# Patient Record
Sex: FEMALE | Race: BLACK OR AFRICAN AMERICAN | NOT HISPANIC OR LATINO | Employment: UNEMPLOYED | URBAN - METROPOLITAN AREA
[De-identification: names, ages, dates, MRNs, and addresses within clinical notes are randomized per-mention and may not be internally consistent; named-entity substitution may affect disease eponyms.]

---

## 2017-07-20 ENCOUNTER — OFFICE VISIT (OUTPATIENT)
Dept: URGENT CARE | Facility: URGENT CARE | Age: 52
End: 2017-07-20
Payer: COMMERCIAL

## 2017-07-20 VITALS
HEART RATE: 85 BPM | TEMPERATURE: 98.7 F | BODY MASS INDEX: 37.48 KG/M2 | DIASTOLIC BLOOD PRESSURE: 83 MMHG | SYSTOLIC BLOOD PRESSURE: 128 MMHG | WEIGHT: 198.38 LBS

## 2017-07-20 DIAGNOSIS — N92.1 METRORRHAGIA: Primary | ICD-10-CM

## 2017-07-20 LAB
ALBUMIN UR-MCNC: 100 MG/DL
APPEARANCE UR: CLEAR
BETA HCG QUAL IFA URINE: NEGATIVE
BILIRUB UR QL STRIP: NEGATIVE
COLOR UR AUTO: YELLOW
GLUCOSE UR STRIP-MCNC: NEGATIVE MG/DL
HGB UR QL STRIP: ABNORMAL
KETONES UR STRIP-MCNC: NEGATIVE MG/DL
LEUKOCYTE ESTERASE UR QL STRIP: NEGATIVE
NITRATE UR QL: NEGATIVE
NON-SQ EPI CELLS #/AREA URNS LPF: ABNORMAL /LPF
PH UR STRIP: 5.5 PH (ref 5–7)
RBC #/AREA URNS AUTO: ABNORMAL /HPF (ref 0–2)
SP GR UR STRIP: 1.02 (ref 1–1.03)
URN SPEC COLLECT METH UR: ABNORMAL
UROBILINOGEN UR STRIP-ACNC: 0.2 EU/DL (ref 0.2–1)
WBC #/AREA URNS AUTO: ABNORMAL /HPF (ref 0–2)

## 2017-07-20 PROCEDURE — 84703 CHORIONIC GONADOTROPIN ASSAY: CPT | Performed by: PHYSICIAN ASSISTANT

## 2017-07-20 PROCEDURE — 99214 OFFICE O/P EST MOD 30 MIN: CPT | Performed by: PHYSICIAN ASSISTANT

## 2017-07-20 PROCEDURE — 81001 URINALYSIS AUTO W/SCOPE: CPT | Performed by: PHYSICIAN ASSISTANT

## 2017-07-20 NOTE — NURSING NOTE
"Chief Complaint   Patient presents with     Vaginal Bleeding     vaginal spotting for 4 days - states she has not had a menses cycle for 8 months.       Initial /83  Pulse 85  Temp 98.7  F (37.1  C) (Oral)  Wt 198 lb 6 oz (90 kg)  Breastfeeding? No  BMI 37.48 kg/m2 Estimated body mass index is 37.48 kg/(m^2) as calculated from the following:    Height as of 5/5/16: 5' 1\" (1.549 m).    Weight as of this encounter: 198 lb 6 oz (90 kg).  Medication Reconciliation: complete    "

## 2017-07-20 NOTE — MR AVS SNAPSHOT
"              After Visit Summary   7/20/2017    Lindsey Fuentes    MRN: [id number]           Patient Information     Date Of Birth          1965        Visit Information        Provider Department      7/20/2017 4:20 PM Gama Sullivan PA-C [facility]        Today's Diagnoses     Metrorrhagia    -  1       Follow-ups after your visit        Your next 10 appointments already scheduled     Jul 24, 2017  1:30 PM CDT   Office Visit with Fadi Thornton MD   [facility] ([facility])    [address] [place] [address] [phone]           Bring a current list of meds and any records pertaining to this visit.  For Physicals, please bring immunization records and any forms needing to be filled out.  Please arrive 10 minutes early to complete paperwork.              Who to contact     If you have questions or need follow up information about today's clinic visit or your schedule please contact [facility] directly at [phone].  Normal or non-critical lab and imaging results will be communicated to you by MyChart, letter or phone within 4 business days after the clinic has received the results. If you do not hear from us within 7 days, please contact the clinic through [employer] or phone. If you have a critical or abnormal lab result, we will notify you by phone as soon as possible.  Submit refill requests through [employer] or call your pharmacy and they will forward the refill request to us. Please allow 3 business days for your refill to be completed.          Additional Information About Your Visit        MyChart Information     [employer] lets you send messages to your doctor, view your test results, renew your prescriptions, schedule appointments and more. To sign up, go to [email or web address] . Click on \"Log in\" on the left side of the screen, which will take you to the Welcome page. Then click on " "\"Sign up Now\" on the right side of the page.     You will be asked to enter the access code listed below, as well as some personal information. Please follow the directions to create your username and password.     Your access code is: [email or web address]  Expires: 10/19/2017  9:19 AM     Your access code will  in 90 days. If you need help or a new code, please call your [place] clinic or [phone].        Care EveryWhere ID     This is your Care EveryWhere ID. This could be used by other organizations to access your [place] medical records  [id number]        Your Vitals Were     Pulse Temperature Breastfeeding? BMI (Body Mass Index)          85 98.7  F (37.1  C) (Oral) No 37.48 kg/m2         Blood Pressure from Last 3 Encounters:   17 128/83   16 122/82   10/20/14 146/87    Weight from Last 3 Encounters:   17 198 lb 6 oz (90 kg)   16 195 lb (88.5 kg)   10/20/14 191 lb 3.2 oz (86.7 kg)              We Performed the Following     Beta HCG qual IFA urine     UA with Microscopic reflex to Culture        Primary Care Provider Office Phone # Fax #    Maria Guadalupe PIERRE MD Zach [phone]       [facility]    [address] [facility] [address]  [facility] [address]        Equal Access to Services     [facility]: Hadii aad ku hadasho Somarianaali, waaxda luqadaha, qaybta kaalmada aderichardyada, edith limon . So [facility] [phone].    ATENCIÓN: Si habla español, tiene a alcaraz disposición servicios gratuitos de asistencia lingüística. Llame al [phone].    We comply with applicable federal civil rights laws and [place] laws. We do not discriminate on the basis of race, color, national origin, age, disability sex, sexual orientation or gender identity.            Thank you!     Thank you for choosing [facility]  for your care. Our goal is always to provide you with excellent care. Hearing back from our patients is one way we " can continue to improve our services. Please take a few minutes to complete the written survey that you may receive in the mail after your visit with us. Thank you!             Your Updated Medication List - Protect others around you: Learn how to safely use, store and throw away your medicines at www.disposemymeds.org.          This list is accurate as of: 7/20/17 11:59 PM.  Always use your most recent med list.                   Brand Name Dispense Instructions for use Diagnosis    Calcium Carbonate-Simethicone 500-125 MG Chew     120 tablet    Take 1 tablet by mouth 3 times daily as needed    Flatulence, eructation, and gas pain       TYLENOL PO      Take 500 mg by mouth every 4 hours as needed for mild pain or fever

## 2017-07-21 NOTE — PROGRESS NOTES
SUBJECTIVE:  Lindsey Fuentes is a 51 year old female who presents with vaginal bleeding.    Onset of symptoms 4 day(s) ago, unchanged since.     Pain:none.     Vaginal bleeding: spotting      Vaginal symptoms: spotting  No LMP recorded. Patient is not currently having periods (Reason: Irregular Periods).    Sexually active: no  Predisposing factors: has not had a period in over a nestor  Hx of previous symptom: some    Past Medical History:   Diagnosis Date     Meningioma (H)      Current Outpatient Prescriptions   Medication Sig Dispense Refill     Calcium Carbonate-Simethicone 500-125 MG CHEW Take 1 tablet by mouth 3 times daily as needed (Patient not taking: Reported on 7/20/2017) 120 tablet 0     Acetaminophen (TYLENOL PO) Take 500 mg by mouth every 4 hours as needed for mild pain or fever       Social History     Social History     Marital status:      Spouse name: N/A     Number of children: N/A     Years of education: N/A     Occupational History     Not on file.     Social History Main Topics     Smoking status: Never Smoker     Smokeless tobacco: Never Used     Alcohol use No     Drug use: No     Sexual activity: Yes     Partners: Male     Other Topics Concern     Not on file     Social History Narrative       ROS:   CONSTITUTIONAL:NEGATIVE for fever, chills, change in weight  INTEGUMENTARY/SKIN: NEGATIVE for worrisome rashes, moles or lesions  ENT/MOUTH: NEGATIVE for ear, mouth and throat problems  RESP:NEGATIVE for significant cough or SOB  CV: NEGATIVE for chest pain, palpitations or peripheral edema  GI: NEGATIVE for nausea, abdominal pain, heartburn, or change in bowel habits  : POSITIVE for vaginal bleeding  MUSCULOSKELETAL: NEGATIVE for significant arthralgias or myalgia  NEURO: NEGATIVE for weakness, dizziness or paresthesias    OBJECTIVE:  /83  Pulse 85  Temp 98.7  F (37.1  C) (Oral)  Wt 198 lb 6 oz (90 kg)  Breastfeeding? No  BMI 37.48 kg/m2    GENERAL APPEARANCE: healthy, alert  and no distress  CV: regular rates and rhythm, normal S1 S2, no murmur noted  ABDOMEN:  soft, nontender, no HSM or masses and bowel sounds normal  BACK: No CVA tenderness  : Deferred  SKIN: no suspicious lesions or rashes    Results for orders placed or performed in visit on 07/20/17   UA with Microscopic reflex to Culture   Result Value Ref Range    Color Urine Yellow     Appearance Urine Clear     Glucose Urine Negative NEG mg/dL    Bilirubin Urine Negative NEG    Ketones Urine Negative NEG mg/dL    Specific Gravity Urine 1.025 1.003 - 1.035    pH Urine 5.5 5.0 - 7.0 pH    Protein Albumin Urine 100 (A) NEG mg/dL    Urobilinogen Urine 0.2 0.2 - 1.0 EU/dL    Nitrite Urine Negative NEG    Blood Urine Large (A) NEG    Leukocyte Esterase Urine Negative NEG    Source Midstream Urine     WBC Urine O - 2 0 - 2 /HPF    RBC Urine 2-5 (A) 0 - 2 /HPF    Squamous Epithelial /LPF Urine Few FEW /LPF   Beta HCG qual IFA urine   Result Value Ref Range    Beta HCG Qual IFA Urine Negative NEG       ASSESSMENT/PLAN:    ICD-10-CM    1. Metrorrhagia N92.1 UA with Microscopic reflex to Culture     Beta HCG qual IFA urine     Patient scheduled with GYN

## 2017-09-08 ENCOUNTER — ALLIED HEALTH/NURSE VISIT (OUTPATIENT)
Dept: LAB | Facility: CLINIC | Age: 52
End: 2017-09-08
Payer: COMMERCIAL

## 2017-09-08 DIAGNOSIS — Z11.1 SCREENING EXAMINATION FOR PULMONARY TUBERCULOSIS: Primary | ICD-10-CM

## 2017-09-08 PROCEDURE — 99207 ZZC DROP WITH A PROCEDURE: CPT | Mod: 25

## 2017-09-08 PROCEDURE — 36415 COLL VENOUS BLD VENIPUNCTURE: CPT | Performed by: NURSE PRACTITIONER

## 2017-09-08 PROCEDURE — 86480 TB TEST CELL IMMUN MEASURE: CPT | Performed by: NURSE PRACTITIONER

## 2017-09-08 NOTE — LETTER
[facility]  [address]  [place]  [address]  Tel: [phone]    September 15, 2017    Lindsey Garciairish  [address] [facility] [address]        Dear Ms. Fuentes,        This is to inform you regarding your test result.    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  Will discuss further on your next office visit with me   Keep your appointment .      Sincerely,    Maria Guadalupe Serrano MD/CATHIE      Enclosure: Lab Results  Results for orders placed or performed in visit on 09/08/17   M Tuberculosis by Quantiferon   Result Value Ref Range    M Tuberculosis Result Positive (A) NEG^Negative    M Tuberculosis Antigen Value 8.73 IU/mL

## 2017-09-08 NOTE — MR AVS SNAPSHOT
"              After Visit Summary   9/8/2017    Lindsey Fuentes    MRN: [id number]           Patient Information     Date Of Birth          1965        Visit Information        Provider Department      9/8/2017 11:45 AM CS LAB [facility]        Today's Diagnoses     Screening examination for pulmonary tuberculosis    -  1       Follow-ups after your visit        Your next 10 appointments already scheduled     Sep 18, 2017  4:00 PM CDT   PHYSICAL with Maria Guadalupe Serrano MD   [facility]    [address] [facility] [address] [phone]              Who to contact     If you have questions or need follow up information about today's clinic visit or your schedule please contact [facility] directly at [phone].  Normal or non-critical lab and imaging results will be communicated to you by MyChart, letter or phone within 4 business days after the clinic has received the results. If you do not hear from us within 7 days, please contact the clinic through MyChart or phone. If you have a critical or abnormal lab result, we will notify you by phone as soon as possible.  Submit refill requests through [employer] or call your pharmacy and they will forward the refill request to us. Please allow 3 business days for your refill to be completed.          Additional Information About Your Visit        MyChart Information     [employer] lets you send messages to your doctor, view your test results, renew your prescriptions, schedule appointments and more. To sign up, go to [email or web address] . Click on \"Log in\" on the left side of the screen, which will take you to the Welcome page. Then click on \"Sign up Now\" on the right side of the page.     You will be asked to enter the access code listed below, as well as some personal information. Please follow the directions to create your username and password.     Your access code is: [email or web address]  Expires: " 10/19/2017  9:19 AM     Your access code will  in 90 days. If you need help or a new code, please call your [place] clinic or [phone].        Care EveryWhere ID     This is your Care EveryWhere ID. This could be used by other organizations to access your [place] medical records  [id number]         Blood Pressure from Last 3 Encounters:   17 128/83   16 122/82   10/20/14 146/87    Weight from Last 3 Encounters:   17 198 lb 6 oz (90 kg)   16 195 lb (88.5 kg)   10/20/14 191 lb 3.2 oz (86.7 kg)              We Performed the Following     M Tuberculosis by Quantiferon        Primary Care Provider Office Phone # Fax #    Maria Guadalupe GABBY Serrano [phone] [address]  [place]                [place] [address]        Equal Access to Services     [facility] : Hadii aad ku hadasho Jos, waaxda luqadaha, qaybta kaalmada adeegyada, waxay laurain hayeugenen halle limon . So [facility] [phone].    ATENCIÓN: Si habla español, tiene a alcaraz disposición servicios gratuitos de asistencia lingüística. Llame al [phone].    We comply with applicable federal civil rights laws and [place] laws. We do not discriminate on the basis of race, color, national origin, age, disability sex, sexual orientation or gender identity.            Thank you!     Thank you for choosing [facility]  for your care. Our goal is always to provide you with excellent care. Hearing back from our patients is one way we can continue to improve our services. Please take a few minutes to complete the written survey that you may receive in the mail after your visit with us. Thank you!             Your Updated Medication List - Protect others around you: Learn how to safely use, store and throw away your medicines at www.disposemymeds.org.          This list is accurate as of: 17 11:59 PM.  Always use your most recent med list.                   Brand Name Dispense Instructions for  use Diagnosis    Calcium Carbonate-Simethicone 500-125 MG Chew     120 tablet    Take 1 tablet by mouth 3 times daily as needed    Flatulence, eructation, and gas pain       TYLENOL PO      Take 500 mg by mouth every 4 hours as needed for mild pain or fever

## 2017-09-11 LAB
M TB TUBERC IFN-G BLD QL: POSITIVE
M TB TUBERC IFN-G/MITOGEN IGNF BLD: 8.73 IU/ML

## 2017-09-14 NOTE — PROGRESS NOTES
Please notify patient by sending following letter with copy of test results    Yony Portillo,    This is to inform you regarding your test result.    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  Will discuss further on your next office visit with me   Keep your appointment .      Dr.Nasima Zach MD,FACP

## 2017-09-18 ENCOUNTER — TELEPHONE (OUTPATIENT)
Dept: FAMILY MEDICINE | Facility: CLINIC | Age: 52
End: 2017-09-18

## 2017-09-18 DIAGNOSIS — R76.12 POSITIVE QUANTIFERON-TB GOLD TEST: Primary | ICD-10-CM

## 2017-09-18 NOTE — TELEPHONE ENCOUNTER
Spoke with patient cannot get in to see Zach today but Dr Serrano agreed to put in order and patient will be in before 1pm to get xray. Please advise once order is signed so we can put patient on schedule for today.    Jeannine Luis, A

## 2017-09-18 NOTE — TELEPHONE ENCOUNTER
Reason for Call:  Other xray    Detailed comments: patient had a positive TB test and wants to come in for an xray today if possible.     Phone Number Patient can be reached at: Home number on file [phone] (home)    Best Time: any    Can we leave a detailed message on this number? YES    Call taken on 9/18/2017 at 9:55 AM by Marysol Bailey

## 2017-09-18 NOTE — TELEPHONE ENCOUNTER
She can make appointment to do that   I have opening today  Will discuss the treatment options also.  Dr.Nasima Zach MD

## 2017-12-20 ENCOUNTER — RADIANT APPOINTMENT (OUTPATIENT)
Dept: GENERAL RADIOLOGY | Facility: CLINIC | Age: 52
End: 2017-12-20
Attending: INTERNAL MEDICINE
Payer: COMMERCIAL

## 2017-12-20 DIAGNOSIS — R76.12 POSITIVE QUANTIFERON-TB GOLD TEST: ICD-10-CM

## 2017-12-20 PROCEDURE — 71020 XR CHEST 2 VW: CPT | Performed by: RADIOLOGY

## 2017-12-20 NOTE — LETTER
[facility]  [address] [place] [address] [facility] [facility]  [address]  [place]  [address]  Tel: [phone]    December 22, 2017    Lindsey Fuentes  [address] [facility] [address]        Dear Ms. Fuentes,    This is to inform you regarding your test result.    Chest XR result is satisfactory .    If you have any further questions or problems, please contact our office.      Sincerely,    Maria Guadalupe Serrano MD/mook    Results for orders placed or performed in visit on 12/20/17   XR Chest 2 Views    Narrative    XR CHEST 2 VW 12/20/2017 11:48 AM    CLINICAL HISTORY: ; Positive QuantiFERON-TB Gold test    COMPARISON: None    FINDINGS:   Question calcified nodules in the right upper lobe. The  lungs and pleural spaces are otherwise clear. The cardiac silhouette  and pulmonary vascularity are normal.      Impression    IMPRESSION: Possible calcified nodules in the right upper lung. No  acute intrathoracic disease.    SALMA AYERS MD           Enclosure: Lab Results

## 2017-12-22 NOTE — PROGRESS NOTES
Please notify patient by sending following letter with copy of test results      Yony Portillo,    This is to inform you regarding your test result.    Chest XR result is satisfactory .    Sincerely,      Dr.Nasima Zach MD,FACP

## 2018-01-02 ENCOUNTER — HOSPITAL ENCOUNTER (OUTPATIENT)
Dept: MAMMOGRAPHY | Facility: CLINIC | Age: 53
Discharge: HOME | End: 2018-01-02
Attending: INTERNAL MEDICINE | Admitting: INTERNAL MEDICINE
Payer: COMMERCIAL

## 2018-01-02 ENCOUNTER — OFFICE VISIT (OUTPATIENT)
Dept: FAMILY MEDICINE | Facility: CLINIC | Age: 53
End: 2018-01-02
Payer: COMMERCIAL

## 2018-01-02 VITALS
TEMPERATURE: 97.9 F | WEIGHT: 198 LBS | DIASTOLIC BLOOD PRESSURE: 86 MMHG | HEIGHT: 62 IN | RESPIRATION RATE: 16 BRPM | BODY MASS INDEX: 36.44 KG/M2 | SYSTOLIC BLOOD PRESSURE: 136 MMHG | HEART RATE: 90 BPM

## 2018-01-02 DIAGNOSIS — Z12.31 VISIT FOR SCREENING MAMMOGRAM: ICD-10-CM

## 2018-01-02 DIAGNOSIS — Z00.00 ROUTINE HISTORY AND PHYSICAL EXAMINATION OF ADULT: Primary | ICD-10-CM

## 2018-01-02 DIAGNOSIS — E66.812 CLASS 2 OBESITY DUE TO EXCESS CALORIES WITHOUT SERIOUS COMORBIDITY WITH BODY MASS INDEX (BMI) OF 36.0 TO 36.9 IN ADULT: ICD-10-CM

## 2018-01-02 DIAGNOSIS — Z12.11 SCREEN FOR COLON CANCER: ICD-10-CM

## 2018-01-02 DIAGNOSIS — H61.22 LEFT EAR IMPACTED CERUMEN: ICD-10-CM

## 2018-01-02 DIAGNOSIS — Z13.228 SCREENING FOR METABOLIC DISORDER: ICD-10-CM

## 2018-01-02 DIAGNOSIS — E66.09 CLASS 2 OBESITY DUE TO EXCESS CALORIES WITHOUT SERIOUS COMORBIDITY WITH BODY MASS INDEX (BMI) OF 36.0 TO 36.9 IN ADULT: ICD-10-CM

## 2018-01-02 DIAGNOSIS — Z13.0 SCREENING FOR DEFICIENCY ANEMIA: ICD-10-CM

## 2018-01-02 DIAGNOSIS — R31.29 MICROSCOPIC HEMATURIA: ICD-10-CM

## 2018-01-02 LAB
ALBUMIN UR-MCNC: >=300 MG/DL
APPEARANCE UR: CLEAR
BILIRUB UR QL STRIP: NEGATIVE
COLOR UR AUTO: YELLOW
ERYTHROCYTE [DISTWIDTH] IN BLOOD BY AUTOMATED COUNT: 15.4 % (ref 10–15)
GLUCOSE UR STRIP-MCNC: NEGATIVE MG/DL
HCT VFR BLD AUTO: 39.4 % (ref 35–47)
HGB BLD-MCNC: 12.7 G/DL (ref 11.7–15.7)
HGB UR QL STRIP: ABNORMAL
KETONES UR STRIP-MCNC: NEGATIVE MG/DL
LEUKOCYTE ESTERASE UR QL STRIP: NEGATIVE
MCH RBC QN AUTO: 26.6 PG (ref 26.5–33)
MCHC RBC AUTO-ENTMCNC: 32.2 G/DL (ref 31.5–36.5)
MCV RBC AUTO: 82 FL (ref 78–100)
MUCOUS THREADS #/AREA URNS LPF: PRESENT /LPF
NITRATE UR QL: NEGATIVE
NON-SQ EPI CELLS #/AREA URNS LPF: ABNORMAL /LPF
PH UR STRIP: 8.5 PH (ref 5–7)
PLATELET # BLD AUTO: 237 10E9/L (ref 150–450)
RBC # BLD AUTO: 4.78 10E12/L (ref 3.8–5.2)
RBC #/AREA URNS AUTO: ABNORMAL /HPF
SOURCE: ABNORMAL
SP GR UR STRIP: 1.02 (ref 1–1.03)
UROBILINOGEN UR STRIP-ACNC: 0.2 EU/DL (ref 0.2–1)
WBC # BLD AUTO: 4.8 10E9/L (ref 4–11)
WBC #/AREA URNS AUTO: ABNORMAL /HPF

## 2018-01-02 PROCEDURE — 36415 COLL VENOUS BLD VENIPUNCTURE: CPT | Performed by: INTERNAL MEDICINE

## 2018-01-02 PROCEDURE — 85027 COMPLETE CBC AUTOMATED: CPT | Performed by: INTERNAL MEDICINE

## 2018-01-02 PROCEDURE — 99396 PREV VISIT EST AGE 40-64: CPT | Performed by: INTERNAL MEDICINE

## 2018-01-02 PROCEDURE — 81001 URINALYSIS AUTO W/SCOPE: CPT | Performed by: INTERNAL MEDICINE

## 2018-01-02 PROCEDURE — 82728 ASSAY OF FERRITIN: CPT | Performed by: INTERNAL MEDICINE

## 2018-01-02 PROCEDURE — 80053 COMPREHEN METABOLIC PANEL: CPT | Performed by: INTERNAL MEDICINE

## 2018-01-02 PROCEDURE — 84443 ASSAY THYROID STIM HORMONE: CPT | Performed by: INTERNAL MEDICINE

## 2018-01-02 PROCEDURE — 80061 LIPID PANEL: CPT | Performed by: INTERNAL MEDICINE

## 2018-01-02 PROCEDURE — 77067 SCR MAMMO BI INCL CAD: CPT

## 2018-01-02 NOTE — PROGRESS NOTES
SUBJECTIVE:   CC: Lindsey Fuentes is an 52 year old woman who presents for preventive health visit.     Healthy Habits:    Do you get at least three servings of calcium containing foods daily (dairy, green leafy vegetables, etc.)yes    Amount of exercise or daily activities, outside of work: 2 day(s) per week    Problems taking medications regularly not applicable    Medication side effects: No    Have you had an eye exam in the past two years? no    Do you see a dentist twice per year? yes    Do you have sleep apnea, excessive snoring or daytime drowsiness?no    Reports rectal mass upon touch  Reports mild intermittent left leg pain    Today's PHQ-2 Score:   PHQ-2 ( 1999 Pf[employer]) 1/2/2018 5/5/2016   Q1: Little interest or pleasure in doing things 0 0   Q2: Feeling down, depressed or hopeless 0 0   PHQ-2 Score 0 0       Abuse: Current or Past(Physical, Sexual or Emotional)- No  Do you feel safe in your environment - Yes    Social History   Substance Use Topics     Smoking status: Never Smoker     Smokeless tobacco: Never Used     Alcohol use No     If you drink alcohol do you typically have >3 drinks per day or >7 drinks per week? No                     Reviewed orders with patient.  Reviewed health maintenance and updated orders accordingly - Yes  Labs reviewed in EPIC  Patient Active Problem List   Diagnosis     CARDIOVASCULAR SCREENING; LDL GOAL LESS THAN 160     Meningioma (H)     Elevated blood pressure     GERD (gastroesophageal reflux disease)     Microscopic hematuria     Positive QuantiFERON-TB Gold test     Class 2 obesity due to excess calories without serious comorbidity with body mass index (BMI) of 36.0 to 36.9 in adult     Past Surgical History:   Procedure Laterality Date     HEAD & NECK SURGERY      removal of meningioma       Social History   Substance Use Topics     Smoking status: Never Smoker     Smokeless tobacco: Never Used     Alcohol use No     Family History   Problem Relation Age of  Onset     CEREBROVASCULAR DISEASE Mother      Hypertension Mother      CEREBROVASCULAR DISEASE Father      Breast Cancer No family hx of      Cancer - colorectal No family hx of          Current Outpatient Prescriptions   Medication Sig Dispense Refill     Calcium Carbonate-Simethicone 500-125 MG CHEW Take 1 tablet by mouth 3 times daily as needed (Patient not taking: Reported on 7/20/2017) 120 tablet 0     Acetaminophen (TYLENOL PO) Take 500 mg by mouth every 4 hours as needed for mild pain or fever       Allergies   Allergen Reactions     No Known Allergies          Patient over age 50, mutual decision to screen reflected in health maintenance.      Pertinent mammograms are reviewed under the imaging tab.  History of abnormal Pap smear: NO - age 30- 65 PAP every 3 years recommended    Reviewed and updated as needed this visit by clinical staffTobacco  Allergies  Meds         Reviewed and updated as needed this visit by Provider          ROS:  C: NEGATIVE for fever, chills, change in weight  I: NEGATIVE for worrisome rashes, moles or lesions  E: NEGATIVE for vision changes or irritation  ENT: NEGATIVE for ear, mouth and throat problems  R: NEGATIVE for significant cough or SOB  B: NEGATIVE for masses, tenderness or discharge  CV: NEGATIVE for chest pain, palpitations or peripheral edema  GI: NEGATIVE for nausea, abdominal pain, heartburn, or change in bowel habits, POSITIVE for rectal mass  : NEGATIVE for unusual urinary or vaginal symptoms. No vaginal bleeding.  M: POSITIVE for left leg pain  N: NEGATIVE for weakness, dizziness or paresthesias  P: NEGATIVE for changes in mood or affect     This document serves as a record of the services and decisions personally performed and made by Maria Guadalupe Serrano MD. It was created on her behalf by Leigha Bello, a trained medical scribe. The creation of this document is based on the provider's statements to the medical scribe.  Leigha Bello 1:10 PM January 2,  "2018    OBJECTIVE:   /86  Pulse 90  Temp 97.9  F (36.6  C) (Oral)  Resp 16  Ht 1.562 m (5' 1.5\")  Wt 89.8 kg (198 lb)  LMP 11/05/2017  Breastfeeding? No  BMI 36.81 kg/m2  EXAM:  GENERAL APPEARANCE: healthy, alert and no distress  EYES: Eyes grossly normal to inspection, PERRL and conjunctivae and sclerae normal  HENT: bilateral ceruminosis impacted in left ear, inflamed nasal mucosa, mouth without ulcers or lesions, oropharynx clear and oral mucous membranes moist  NECK: no adenopathy, no asymmetry, masses, or scars and thyroid normal to palpation  RESP: lungs clear to auscultation - no rales, rhonchi or wheezes  BREAST: normal without masses, tenderness or nipple discharge and no palpable axillary masses or adenopathy  CV: regular rate and rhythm, normal S1 S2, no S3 or S4, no murmur, click or rub, mild bilateral LL edema and peripheral pulses strong  ABDOMEN: soft, nontender, no hepatosplenomegaly, no masses and bowel sounds normal  RECTAL: normal sphincter tone, one small external hemorrhoid  MS: no musculoskeletal defects are noted and gait is age appropriate without ataxia  SKIN: no suspicious lesions or rashes  NEURO: Normal strength and tone, sensory exam grossly normal, mentation intact and speech normal  PSYCH: mentation appears normal and affect normal/bright    ASSESSMENT/PLAN:   Lindsey was seen today for physical.    Diagnoses and all orders for this visit:    Routine history and physical examination of adult  -     TSH with free T4 reflex  -     Lipid panel reflex to direct LDL Fasting  -     CBC with platelets  -     UA reflex to Microscopic  Patient came in today for a wellness visit  She reported mild intermittent left leg pain and a rectal mass  Upon examination, she had bilateral mild LL edema and one small external hemorrhoid  I have ordered a colonoscopy for her as she's due  She had been complaining of rhinorrhea and sinus symptoms  Upon examination, I found her nasal mucosa " swollen  I advised her to use saline nasal spray 2-3 times daily    Visit for screening mammogram  -     *MA Screening Digital Bilateral; Future  Patient is due for her mammogram  I advised her to get it done at her earliest convenience    Screen for colon cancer  -     GASTROENTEROLOGY ADULT REF PROCEDURE ONLY  Patient is due for colonoscopy  I referred her to GI to get it done    Microscopic hematuria  -     UA reflex to Microscopic  Patient had microscopic hematuria on tests done in 07/20/17  I re-did her tests today to see if this is still an issue  She doesn't report any urinary symptoms    Screening for metabolic disorder  -     Comprehensive metabolic panel    Class 2 obesity due to excess calories without serious comorbidity with body mass index (BMI) of 36.0 to 36.9 in adult  I emphasized importance of weight-loss due to her high BMI  I educated her about healthy eating and exercise habits  Patient verbally agreed to make more cognizant decisions to help her lose weight    Left ear impacted cerumen  Upon examination, there was cerumen in both ears but it was impacted in left ear  Ear wash today    Patient Instructions   Ear wash today  Labs today  Schedule mammogram at your earliest convenience  Schedule colonoscopy at your earliest convenience  [place] [facility] [phone]  Start using ocean nasal( saline nasal spray)  Spray regularly    COUNSELING:   Reviewed preventive health counseling, as reflected in patient instructions       Regular exercise       Healthy diet/nutrition       Colon cancer screening    BP Screening:   Last 3 BP Readings:    BP Readings from Last 3 Encounters:   01/02/18 136/86   07/20/17 128/83   05/05/16 122/82       The following was recommended to the patient:  Re-screen BP within a year and recommended lifestyle modifications     reports that she has never smoked. She has never used smokeless tobacco.    Estimated body mass index is 36.81 kg/(m^2) as calculated from the  "following:    Height as of this encounter: 1.562 m (5' 1.5\").    Weight as of this encounter: 89.8 kg (198 lb).   Weight management plan: Discussed healthy diet and exercise guidelines and patient will follow up in 12 months in clinic to re-evaluate.    Counseling Resources:  ATP IV Guidelines  Pooled Cohorts Equation Calculator  Breast Cancer Risk Calculator  FRAX Risk Assessment  ICSI Preventive Guidelines  Dietary Guidelines for Americans, 2010  USDA's MyPlate  ASA Prophylaxis  Lung CA Screening    The information in this document, created by the medical scribe for me, accurately reflects the services I personally performed and the decisions made by me. I have reviewed and approved this document for accuracy prior to leaving the patient care area.  January 2, 2018 1:29 PM    Maria Guadalupe Serrano MD  [facility]  "

## 2018-01-02 NOTE — NURSING NOTE
"Chief Complaint   Patient presents with     Physical     fasting       Initial /86  Pulse 90  Temp 97.9  F (36.6  C) (Oral)  Resp 16  Ht 5' 1.5\" (1.562 m)  Wt 198 lb (89.8 kg)  LMP 11/05/2017  Breastfeeding? No  BMI 36.81 kg/m2 Estimated body mass index is 36.81 kg/(m^2) as calculated from the following:    Height as of this encounter: 5' 1.5\" (1.562 m).    Weight as of this encounter: 198 lb (89.8 kg).  Medication Reconciliation: complete  "

## 2018-01-02 NOTE — MR AVS SNAPSHOT
After Visit Summary   1/2/2018    Lindsey Fuentes    MRN: [id number]           Patient Information     Date Of Birth          1965        Visit Information        Provider Department      1/2/2018 1:00 PM Maria Guadalupe Serrano MD [facility]        Today's Diagnoses     Routine history and physical examination of adult    -  1    Visit for screening mammogram        Screen for colon cancer        Microscopic hematuria        Screening for metabolic disorder        Class 2 obesity due to excess calories without serious comorbidity with body mass index (BMI) of 36.0 to 36.9 in adult        Left ear impacted cerumen          Care Instructions      Preventive Health Recommendations  Female Ages 50 - 64    Yearly exam: See your health care provider every year in order to  o Review health changes.   o Discuss preventive care.    o Review your medicines if your doctor has prescribed any.      Get a Pap test every three years (unless you have an abnormal result and your provider advises testing more often).    If you get Pap tests with HPV test, you only need to test every 5 years, unless you have an abnormal result.     You do not need a Pap test if your uterus was removed (hysterectomy) and you have not had cancer.    You should be tested each year for STDs (sexually transmitted diseases) if you're at risk.     Have a mammogram every 1 to 2 years.    Have a colonoscopy at age 50, or have a yearly FIT test (stool test). These exams screen for colon cancer.      Have a cholesterol test every 5 years, or more often if advised.    Have a diabetes test (fasting glucose) every three years. If you are at risk for diabetes, you should have this test more often.     If you are at risk for osteoporosis (brittle bone disease), think about having a bone density scan (DEXA).    Shots: Get a flu shot each year. Get a tetanus shot every 10 years.    Nutrition:     Eat at least 5 servings of fruits and  vegetables each day.    Eat whole-grain bread, whole-wheat pasta and brown rice instead of white grains and rice.    Talk to your provider about Calcium and Vitamin D.     Lifestyle    Exercise at least 150 minutes a week (30 minutes a day, 5 days a week). This will help you control your weight and prevent disease.    Limit alcohol to one drink per day.    No smoking.     Wear sunscreen to prevent skin cancer.     See your dentist every six months for an exam and cleaning.    See your eye doctor every 1 to 2 years.    Ear wash today  Labs today  Schedule mammogram at your earliest convenience  Schedule colonoscopy at your earliest convenience  [facility] [phone]  Start using ocean nasal( saline nasal spray)  Spray regularly    Your BMI is Body mass index is 36.81 kg/(m^2).  Weight management is a personal decision.  If you are interested in exploring weight loss strategies, the following discussion covers the approaches that may be successful. Body mass index (BMI) is one way to tell whether you are at a healthy weight, overweight, or obese. It measures your weight in relation to your height.  A BMI of 18.5 to 24.9 is in the healthy range. A person with a BMI of 25 to 29.9 is considered overweight, and someone with a BMI of 30 or greater is considered obese. More than two-thirds of American adults are considered overweight or obese.  Being overweight or obese increases the risk for further weight gain. Excess weight may lead to heart disease and diabetes.  Creating and following plans for healthy eating and physical activity may help you improve your health.  Weight control is part of healthy lifestyle and includes exercise, emotional health, and healthy eating habits. Careful eating habits lifelong are the mainstay of weight control. Though there are significant health benefits from weight loss, long-term weight loss with diet alone may be very difficult to achieve- studies show long-term success  with dietary management in less than 10% of people. Attaining a healthy weight may be especially difficult to achieve in those with severe obesity. In some cases, medications, devices and surgical management might be considered.  What can you do?  If you are overweight or obese and are interested in methods for weight loss, you should discuss this with your provider.     Consider reducing daily calorie intake by 500 calories.     Keep a food journal.     Avoiding skipping meals, consider cutting portions instead.    Diet combined with exercise helps maintain muscle while optimizing fat loss. Strength training is particularly important for building and maintaining muscle mass. Exercise helps reduce stress, increase energy, and improves fitness. Increasing exercise without diet control, however, may not burn enough calories to loose weight.       Start walking three days a week 10-20 minutes at a time    Work towards walking thirty minutes five days a week     Eventually, increase the speed of your walking for 1-2 minutes at time    In addition, we recommend that you review healthy lifestyles and methods for weight loss available through the National Institutes of Health patient information sites:  http://win.niddk.nih.gov/publications/index.htm    And look into health and wellness programs that may be available through your health insurance provider, employer, local community center, or nely club.                Follow-ups after your visit        Additional Services     GASTROENTEROLOGY ADULT REF PROCEDURE ONLY                 Future tests that were ordered for you today     Open Future Orders        Priority Expected Expires Ordered    *MA Screening Digital Bilateral Routine  1/2/2019 1/2/2018            Who to contact     If you have questions or need follow up information about today's clinic visit or your schedule please contact [facility] directly at [phone].  Normal or non-critical lab and  "imaging results will be communicated to you by MyChart, letter or phone within 4 business days after the clinic has received the results. If you do not hear from us within 7 days, please contact the clinic through [employer] or phone. If you have a critical or abnormal lab result, we will notify you by phone as soon as possible.  Submit refill requests through Mr. Number or call your pharmacy and they will forward the refill request to us. Please allow 3 business days for your refill to be completed.          Additional Information About Your Visit        [facility] Information     Mr. Number lets you send messages to your doctor, view your test results, renew your prescriptions, schedule appointments and more. To sign up, go to [email or web address] . Click on \"Log in\" on the left side of the screen, which will take you to the Welcome page. Then click on \"Sign up Now\" on the right side of the page.     You will be asked to enter the access code listed below, as well as some personal information. Please follow the directions to create your username and password.     Your access code is: [email or web address]  Expires: 2018  9:36 AM     Your access code will  in 90 days. If you need help or a new code, please call your [place] clinic or [phone].        Care EveryWhere ID     This is your Care EveryWhere ID. This could be used by other organizations to access your [place] medical records  [id number]        Your Vitals Were     Pulse Temperature Respirations Height Last Period Breastfeeding?    90 97.9  F (36.6  C) (Oral) 16 5' 1.5\" (1.562 m) 2017 No    BMI (Body Mass Index)                   36.81 kg/m2            Blood Pressure from Last 3 Encounters:   18 136/86   17 128/83   16 122/82    Weight from Last 3 Encounters:   18 198 lb (89.8 kg)   17 198 lb 6 oz (90 kg)   16 195 lb (88.5 kg)              We Performed the Following     CBC with platelets     Comprehensive " metabolic panel     GASTROENTEROLOGY ADULT REF PROCEDURE ONLY     Lipid panel reflex to direct LDL Fasting     TSH with free T4 reflex     UA reflex to Microscopic        Primary Care Provider Office Phone # Fax #    Maria Guadalupe Serrano [phone] [address] [place]  [facility] [address]        Equal Access to Services     NELI HALEY : Hadii aad ku hadasho Somarianaali, waaxda luqadaha, qaybta kaalmada aderichardyada, waxay laurain hayaan aderichard gurrolakobescott limon . So [facility] [phone].    ATENCIÓN: Si habla español, tiene a alcaraz disposición servicios gratuitos de asistencia lingüística. Llame al [phone].    We comply with applicable federal civil rights laws and [place] laws. We do not discriminate on the basis of race, color, national origin, age, disability, sex, sexual orientation, or gender identity.            Thank you!     Thank you for choosing [facility]  for your care. Our goal is always to provide you with excellent care. Hearing back from our patients is one way we can continue to improve our services. Please take a few minutes to complete the written survey that you may receive in the mail after your visit with us. Thank you!             Your Updated Medication List - Protect others around you: Learn how to safely use, store and throw away your medicines at www.disposemymeds.org.          This list is accurate as of: 1/2/18  1:25 PM.  Always use your most recent med list.                   Brand Name Dispense Instructions for use Diagnosis    Calcium Carbonate-Simethicone 500-125 MG Chew     120 tablet    Take 1 tablet by mouth 3 times daily as needed    Flatulence, eructation, and gas pain       TYLENOL PO      Take 500 mg by mouth every 4 hours as needed for mild pain or fever

## 2018-01-02 NOTE — LETTER
[facility]  [address]  [address]  [place]  [address]  Tel: [phone]    January 4, 2018    Lindsey Fuentes  [address] [id number]        Dear Ms. Fuentes,    This is to inform you regarding your test result.     Urine test is positive for red blood cells.   You had work up done in the past .   You have seen urologist.   Recheck UA in 3 months   TSH which is thyroid hormone is normal.   The testing of your kidney function, liver function and electrolytes was satisfactory   Your total cholesterol is elevated.   The triglycerides are high. Lowering  the amount of sugar ,alcohol and sweets in the diet helps to control this.Exercise and weight loss helps.   HDL which is called good cholesterol is normal.   Your LDL which is called bad cholesterol is elevated.   Eat low cholesterol low fat  diet and do regular physical activity. Avoid high sugar containing food.   CBC result which includes Hemoglobin and  Platelet Counts is satisfactory.     Sincerely,    Maria Guadalupe Serrano MD/CATHIE          Enclosure: Lab Results  Results for orders placed or performed in visit on 01/02/18   TSH with free T4 reflex   Result Value Ref Range    TSH 1.28 0.40 - 4.00 mU/L   Lipid panel reflex to direct LDL Fasting   Result Value Ref Range    Cholesterol 207 (H) <200 mg/dL    Triglycerides 198 (H) <150 mg/dL    HDL Cholesterol 51 >49 mg/dL    LDL Cholesterol Calculated 116 (H) <100 mg/dL    Non HDL Cholesterol 156 (H) <130 mg/dL   Comprehensive metabolic panel   Result Value Ref Range    Sodium 139 133 - 144 mmol/L    Potassium 4.2 3.4 - 5.3 mmol/L    Chloride 104 94 - 109 mmol/L    Carbon Dioxide 27 20 - 32 mmol/L    Anion Gap 8 3 - 14 mmol/L    Glucose 80 70 - 99 mg/dL    Urea Nitrogen 6 (L) 7 - 30 mg/dL    Creatinine 0.57 0.52 - 1.04 mg/dL    GFR Estimate >90 >60 mL/min/1.7m2    GFR Estimate If Black >90 >60 mL/min/1.7m2    Calcium 9.1 8.5 - 10.1 mg/dL    Bilirubin Total 0.4 0.2 - 1.3 mg/dL     Albumin 3.6 3.4 - 5.0 g/dL    Protein Total 7.6 6.8 - 8.8 g/dL    Alkaline Phosphatase 96 40 - 150 U/L    ALT 29 0 - 50 U/L    AST 17 0 - 45 U/L   CBC with platelets   Result Value Ref Range    WBC 4.8 4.0 - 11.0 10e9/L    RBC Count 4.78 3.8 - 5.2 10e12/L    Hemoglobin 12.7 11.7 - 15.7 g/dL    Hematocrit 39.4 35.0 - 47.0 %    MCV 82 78 - 100 fl    MCH 26.6 26.5 - 33.0 pg    MCHC 32.2 31.5 - 36.5 g/dL    RDW 15.4 (H) 10.0 - 15.0 %    Platelet Count 237 150 - 450 10e9/L   UA reflex to Microscopic   Result Value Ref Range    Color Urine Yellow     Appearance Urine Clear     Glucose Urine Negative NEG^Negative mg/dL    Bilirubin Urine Negative NEG^Negative    Ketones Urine Negative NEG^Negative mg/dL    Specific Gravity Urine 1.020 1.003 - 1.035    Blood Urine Moderate (A) NEG^Negative    pH Urine 8.5 (H) 5.0 - 7.0 pH    Protein Albumin Urine >=300 (A) NEG^Negative mg/dL    Urobilinogen Urine 0.2 0.2 - 1.0 EU/dL    Nitrite Urine Negative NEG^Negative    Leukocyte Esterase Urine Negative NEG^Negative    Source Midstream Urine    Urine Microscopic   Result Value Ref Range    WBC Urine O - 2 OTO2^O - 2 /HPF    RBC Urine 10-25 (A) OTO2^O - 2 /HPF    Squamous Epithelial /LPF Urine Few FEW^Few /LPF    Mucous Urine Present (A) NEG^Negative /LPF   Ferritin   Result Value Ref Range    Ferritin 72 8 - 252 ng/mL

## 2018-01-02 NOTE — PATIENT INSTRUCTIONS
Preventive Health Recommendations  Female Ages 50 - 64    Yearly exam: See your health care provider every year in order to  o Review health changes.   o Discuss preventive care.    o Review your medicines if your doctor has prescribed any.      Get a Pap test every three years (unless you have an abnormal result and your provider advises testing more often).    If you get Pap tests with HPV test, you only need to test every 5 years, unless you have an abnormal result.     You do not need a Pap test if your uterus was removed (hysterectomy) and you have not had cancer.    You should be tested each year for STDs (sexually transmitted diseases) if you're at risk.     Have a mammogram every 1 to 2 years.    Have a colonoscopy at age 50, or have a yearly FIT test (stool test). These exams screen for colon cancer.      Have a cholesterol test every 5 years, or more often if advised.    Have a diabetes test (fasting glucose) every three years. If you are at risk for diabetes, you should have this test more often.     If you are at risk for osteoporosis (brittle bone disease), think about having a bone density scan (DEXA).    Shots: Get a flu shot each year. Get a tetanus shot every 10 years.    Nutrition:     Eat at least 5 servings of fruits and vegetables each day.    Eat whole-grain bread, whole-wheat pasta and brown rice instead of white grains and rice.    Talk to your provider about Calcium and Vitamin D.     Lifestyle    Exercise at least 150 minutes a week (30 minutes a day, 5 days a week). This will help you control your weight and prevent disease.    Limit alcohol to one drink per day.    No smoking.     Wear sunscreen to prevent skin cancer.     See your dentist every six months for an exam and cleaning.    See your eye doctor every 1 to 2 years.    Ear wash today  Labs today  Schedule mammogram at your earliest convenience  Schedule colonoscopy at your earliest convenience  Beto Mcclure [phone]  [phone]  Start using ocean nasal( saline nasal spray)  Spray regularly    Your BMI is Body mass index is 36.81 kg/(m^2).  Weight management is a personal decision.  If you are interested in exploring weight loss strategies, the following discussion covers the approaches that may be successful. Body mass index (BMI) is one way to tell whether you are at a healthy weight, overweight, or obese. It measures your weight in relation to your height.  A BMI of 18.5 to 24.9 is in the healthy range. A person with a BMI of 25 to 29.9 is considered overweight, and someone with a BMI of 30 or greater is considered obese. More than two-thirds of American adults are considered overweight or obese.  Being overweight or obese increases the risk for further weight gain. Excess weight may lead to heart disease and diabetes.  Creating and following plans for healthy eating and physical activity may help you improve your health.  Weight control is part of healthy lifestyle and includes exercise, emotional health, and healthy eating habits. Careful eating habits lifelong are the mainstay of weight control. Though there are significant health benefits from weight loss, long-term weight loss with diet alone may be very difficult to achieve- studies show long-term success with dietary management in less than 10% of people. Attaining a healthy weight may be especially difficult to achieve in those with severe obesity. In some cases, medications, devices and surgical management might be considered.  What can you do?  If you are overweight or obese and are interested in methods for weight loss, you should discuss this with your provider.     Consider reducing daily calorie intake by 500 calories.     Keep a food journal.     Avoiding skipping meals, consider cutting portions instead.    Diet combined with exercise helps maintain muscle while optimizing fat loss. Strength training is particularly important for building and maintaining muscle  mass. Exercise helps reduce stress, increase energy, and improves fitness. Increasing exercise without diet control, however, may not burn enough calories to loose weight.       Start walking three days a week 10-20 minutes at a time    Work towards walking thirty minutes five days a week     Eventually, increase the speed of your walking for 1-2 minutes at time    In addition, we recommend that you review healthy lifestyles and methods for weight loss available through the National Institutes of Health patient information sites:  http://win.niddk.nih.gov/publications/index.htm    And look into health and wellness programs that may be available through your health insurance provider, employer, local community center, or nely club.

## 2018-01-03 DIAGNOSIS — Z13.0 SCREENING FOR DEFICIENCY ANEMIA: Primary | ICD-10-CM

## 2018-01-03 LAB
ALBUMIN SERPL-MCNC: 3.6 G/DL (ref 3.4–5)
ALP SERPL-CCNC: 96 U/L (ref 40–150)
ALT SERPL W P-5'-P-CCNC: 29 U/L (ref 0–50)
ANION GAP SERPL CALCULATED.3IONS-SCNC: 8 MMOL/L (ref 3–14)
AST SERPL W P-5'-P-CCNC: 17 U/L (ref 0–45)
BILIRUB SERPL-MCNC: 0.4 MG/DL (ref 0.2–1.3)
BUN SERPL-MCNC: 6 MG/DL (ref 7–30)
CALCIUM SERPL-MCNC: 9.1 MG/DL (ref 8.5–10.1)
CHLORIDE SERPL-SCNC: 104 MMOL/L (ref 94–109)
CHOLEST SERPL-MCNC: 207 MG/DL
CO2 SERPL-SCNC: 27 MMOL/L (ref 20–32)
CREAT SERPL-MCNC: 0.57 MG/DL (ref 0.52–1.04)
GFR SERPL CREATININE-BSD FRML MDRD: >90 ML/MIN/1.7M2
GLUCOSE SERPL-MCNC: 80 MG/DL (ref 70–99)
HDLC SERPL-MCNC: 51 MG/DL
LDLC SERPL CALC-MCNC: 116 MG/DL
NONHDLC SERPL-MCNC: 156 MG/DL
POTASSIUM SERPL-SCNC: 4.2 MMOL/L (ref 3.4–5.3)
PROT SERPL-MCNC: 7.6 G/DL (ref 6.8–8.8)
SODIUM SERPL-SCNC: 139 MMOL/L (ref 133–144)
TRIGL SERPL-MCNC: 198 MG/DL
TSH SERPL DL<=0.005 MIU/L-ACNC: 1.28 MU/L (ref 0.4–4)

## 2018-01-04 LAB — FERRITIN SERPL-MCNC: 72 NG/ML (ref 8–252)

## 2018-01-04 NOTE — PROGRESS NOTES
Please notify patient by sending following letter with copy of test results      Yony Portillo,    This is to inform you regarding your test result.    Urine test is positive for red blood cells.  You had work up done in the past .  You have seen urologist.  Recheck UA in 3 months  TSH which is thyroid hormone is normal.  The testing of your kidney function, liver function and electrolytes was satisfactory   Your total cholesterol is elevated.  The triglycerides are high. Lowering  the amount of sugar ,alcohol and sweets in the diet helps to control this.Exercise and weight loss helps.  HDL which is called good cholesterol is normal.  Your LDL which is called bad cholesterol is elevated.  Eat low cholesterol low fat  diet and do regular physical activity. Avoid high sugar containing food.  CBC result which includes Hemoglobin and  Platelet Counts is satisfactory.        Sincerely,      Dr.Nasima Zach MD,FACP

## 2018-01-08 ENCOUNTER — TRANSFERRED RECORDS (OUTPATIENT)
Dept: HEALTH INFORMATION MANAGEMENT | Facility: CLINIC | Age: 53
End: 2018-01-08

## 2018-01-15 ENCOUNTER — SURGERY (OUTPATIENT)
Age: 53
End: 2018-01-15

## 2018-01-15 ENCOUNTER — HOSPITAL ENCOUNTER (OUTPATIENT)
Facility: CLINIC | Age: 53
Discharge: HOME | End: 2018-01-15
Attending: INTERNAL MEDICINE | Admitting: INTERNAL MEDICINE
Payer: COMMERCIAL

## 2018-01-15 VITALS
HEIGHT: 61 IN | OXYGEN SATURATION: 98 % | RESPIRATION RATE: 10 BRPM | BODY MASS INDEX: 37.38 KG/M2 | WEIGHT: 198 LBS | DIASTOLIC BLOOD PRESSURE: 84 MMHG | SYSTOLIC BLOOD PRESSURE: 127 MMHG

## 2018-01-15 LAB — COLONOSCOPY: NORMAL

## 2018-01-15 PROCEDURE — G0121 COLON CA SCRN NOT HI RSK IND: HCPCS | Mod: 74 | Performed by: INTERNAL MEDICINE

## 2018-01-15 PROCEDURE — 25000128 H RX IP 250 OP 636: Performed by: INTERNAL MEDICINE

## 2018-01-15 PROCEDURE — G0500 MOD SEDAT ENDO SERVICE >5YRS: HCPCS | Performed by: INTERNAL MEDICINE

## 2018-01-15 PROCEDURE — 45378 DIAGNOSTIC COLONOSCOPY: CPT | Performed by: INTERNAL MEDICINE

## 2018-01-15 RX ORDER — LIDOCAINE 40 MG/G
CREAM TOPICAL
Status: DISCONTINUED | OUTPATIENT
Start: 2018-01-15 | End: 2018-01-15 | Stop reason: HOSPADM

## 2018-01-15 RX ORDER — FENTANYL CITRATE 50 UG/ML
INJECTION, SOLUTION INTRAMUSCULAR; INTRAVENOUS PRN
Status: DISCONTINUED | OUTPATIENT
Start: 2018-01-15 | End: 2018-01-15 | Stop reason: HOSPADM

## 2018-01-15 RX ORDER — ONDANSETRON 2 MG/ML
4 INJECTION INTRAMUSCULAR; INTRAVENOUS
Status: DISCONTINUED | OUTPATIENT
Start: 2018-01-15 | End: 2018-01-15 | Stop reason: HOSPADM

## 2018-01-15 RX ADMIN — MIDAZOLAM 2 MG: 1 INJECTION INTRAMUSCULAR; INTRAVENOUS at 15:16

## 2018-01-15 RX ADMIN — FENTANYL CITRATE 100 MCG: 50 INJECTION, SOLUTION INTRAMUSCULAR; INTRAVENOUS at 15:14

## 2018-01-15 RX ADMIN — MIDAZOLAM 2 MG: 1 INJECTION INTRAMUSCULAR; INTRAVENOUS at 15:28

## 2018-01-15 NOTE — OR NURSING
Pts prep was inadequate so she will repeat miralax and gatorade and do colonoscopy tomorrow morning at   Dr. Raymundo's office.

## 2018-01-16 ENCOUNTER — TRANSFERRED RECORDS (OUTPATIENT)
Dept: HEALTH INFORMATION MANAGEMENT | Facility: CLINIC | Age: 53
End: 2018-01-16

## 2018-05-15 ENCOUNTER — OFFICE VISIT (OUTPATIENT)
Dept: FAMILY MEDICINE | Facility: CLINIC | Age: 53
End: 2018-05-15
Payer: COMMERCIAL

## 2018-05-15 VITALS
RESPIRATION RATE: 18 BRPM | DIASTOLIC BLOOD PRESSURE: 78 MMHG | HEIGHT: 61 IN | TEMPERATURE: 97.5 F | BODY MASS INDEX: 37.95 KG/M2 | HEART RATE: 67 BPM | OXYGEN SATURATION: 99 % | WEIGHT: 201 LBS | SYSTOLIC BLOOD PRESSURE: 124 MMHG

## 2018-05-15 DIAGNOSIS — D32.9 MENINGIOMA (H): ICD-10-CM

## 2018-05-15 DIAGNOSIS — R91.8 ABNORMAL CXR WITH MULTIPLE NODULES: ICD-10-CM

## 2018-05-15 DIAGNOSIS — E78.2 MIXED HYPERLIPIDEMIA: ICD-10-CM

## 2018-05-15 DIAGNOSIS — R76.12 POSITIVE QUANTIFERON-TB GOLD TEST: ICD-10-CM

## 2018-05-15 DIAGNOSIS — G44.219 EPISODIC TENSION-TYPE HEADACHE, NOT INTRACTABLE: Primary | ICD-10-CM

## 2018-05-15 PROBLEM — E66.09 CLASS 2 OBESITY DUE TO EXCESS CALORIES WITHOUT SERIOUS COMORBIDITY WITH BODY MASS INDEX (BMI) OF 36.0 TO 36.9 IN ADULT: Status: RESOLVED | Noted: 2018-01-02 | Resolved: 2018-05-15

## 2018-05-15 PROBLEM — E66.812 CLASS 2 OBESITY DUE TO EXCESS CALORIES WITHOUT SERIOUS COMORBIDITY WITH BODY MASS INDEX (BMI) OF 36.0 TO 36.9 IN ADULT: Status: RESOLVED | Noted: 2018-01-02 | Resolved: 2018-05-15

## 2018-05-15 PROCEDURE — 99214 OFFICE O/P EST MOD 30 MIN: CPT | Performed by: FAMILY MEDICINE

## 2018-05-15 NOTE — MR AVS SNAPSHOT
After Visit Summary   5/15/2018    Lindsey Fuentes    MRN: [id number]           Patient Information     Date Of Birth          1965        Visit Information        Provider Department      5/15/2018 2:20 PM Danielle Gomez MD [facility]        Today's Diagnoses     Episodic tension-type headache, not intractable Rt temporal    -  1    Meningioma (H) 10-14 s/po Lt frontotemp craniotomy         Mixed hyperlipidemia        Class 2 obesity due to excess calories with serious comorbidity and body mass index (BMI) of 38.0 to 38.9 in adult          Care Instructions    1. Shingrex is a 2 shot series that prevents shingles 97% of the time, as opposed to the old shingles shot that only prevented it at 40-50%  It costs less for medicare at a pharmacy  You should get it starting at 50 yrs old     2.  Weight Loss Tips  1. Do not eat after 6 hrs before your expected bedtime  2. Have your heaviest meal for breakfast, a slightly lighter meal at lunch and a snack 6 hrs before bed  3. No sugar/calorie drinks except milk ie no fruit juice, pop, alcohol.  4. Drink milk 30min before meals to decrease your hunger. Also it is excellent as part of your last meal of the day snack  5. Drink lots of water  6. Increase fiber in diet: all bran cereal, salads, popcorn etc  7. Have only one small serving of fruit a day about 1/2 cup (as this is high in sugar)  8. EXERCISE is the bottom line. Without it, you will gain weight even on a low calorie diet. Best if done 2-3X a day as can    Being overweight contributes to high blood pressure and high cholesterol, both of which cause heart attacks, strokes and kidney failure, prediabetes and diabetes, arthritis, and liver disease     3. No difference was  Noted by patients in a double blind study when given codeine, tylenol ( acetaminophen) or ibuprofen (all in identical pills). They felt no difference in pain relief. Since ibuprofen and the  NSAIDs  causes kidney damage, esophageal damage with heartburn, and can increase the risk of esophageal and stomach cancer and ulcers,and colonic strictures. They also cause increased risk of heart attack .   I recommend that you use tylenol(acetaminophen) for pain. Use the acetaminophen ES  Which has 500mgm/tablet You can take up to 2 tablets 4 times a day as need for pain.  If this is not enough, you can add in ibuprofen or aleve(naprosyn) with 2 glasses of fluid and some food-to protect the stomach and esophagus. Please let us know if you are continuing to take ibuprofen or aleve, as we will need to periodically check your kidney function with a blood test.    4.  Run a cold air vaporizer as much as possible. If you cannot,  boil water and breath the warm vapors 2-3 times a day to try to open up the sinuses take 2400mgm of guaifenesin per 24 hours   You can do this by taking  Mucinex plain blue  1200 mg  One tablet twice a day (This may come as 600mg/tablet and you need to take 2 tabs twice a day) or you could buy the cheaper  generic 400mgm / tab and take 2 tablets 3 x a day or 1 and 1/2 tablets 4 x a day . .Guaifenesin is  the major component of most cough syrups, because it makes the mucus less thick, and therefore it drains out better and you are less likely to cough from it dripping on the back of your throat.  Irrigate the  nose with plain water under the kitchen sink faucet or the shower.  Ania pots, spray bottles, etc accumulate bacteria and are not recommended.   The tickle in the throat is also helped by gargling with vinegar and honey mixture, or pop or mouth wash as these coat the throat.  Please try to rinse teeth with water after using these .   Do not use sudafed or pseudephedrine as it dries the mucus up so it is harder to get it out, and it can raise your BP     5. See Dr Alan Marks of neurosurgery  [phone]          Follow-ups after your visit        Who to contact     If you have  "questions or need follow up information about today's clinic visit or your schedule please contact [facility] directly at [phone].  Normal or non-critical lab and imaging results will be communicated to you by MyChart, letter or phone within 4 business days after the clinic has received the results. If you do not hear from us within 7 days, please contact the clinic through [facility] or phone. If you have a critical or abnormal lab result, we will notify you by phone as soon as possible.  Submit refill requests through [employer] or call your pharmacy and they will forward the refill request to us. Please allow 3 business days for your refill to be completed.          Additional Information About Your Visit        [facility] Information     [employer] lets you send messages to your doctor, view your test results, renew your prescriptions, schedule appointments and more. To sign up, go to [email or web address] . Click on \"Log in\" on the left side of the screen, which will take you to the Welcome page. Then click on \"Sign up Now\" on the right side of the page.     You will be asked to enter the access code listed below, as well as some personal information. Please follow the directions to create your username and password.     Your access code is: [email or web address]  Expires: 2018  3:19 PM     Your access code will  in 90 days. If you need help or a new code, please call your [facility] clinic or [phone].        Care EveryWhere ID     This is your Care EveryWhere ID. This could be used by other organizations to access your [facility] medical records  [id number]        Your Vitals Were     Pulse Temperature Respirations Height Last Period Pulse Oximetry    67 97.5  F (36.4  C) (Tympanic) 18 5' 1\" (1.549 m) (LMP Unknown) 99%    Breastfeeding? BMI (Body Mass Index)                No 37.98 kg/m2           Blood Pressure from Last 3 Encounters:   05/15/18 124/78   01/15/18 127/84 "   01/02/18 136/86    Weight from Last 3 Encounters:   05/15/18 201 lb (91.2 kg)   01/15/18 198 lb (89.8 kg)   01/02/18 198 lb (89.8 kg)              Today, you had the following     No orders found for display       Primary Care Provider Office Phone # Fax #    Maria Guadalupe GABBY Serrano [phone] [address]  [place]                [place] [address]        Equal Access to Services     [facility] : Hadii aad ku hadasho Somarianaali, waaxda luqadaha, qaybta kaalmada adeegyada, waxay idiin hayaan adeeg galileoarascott laovidio . So [facility] [phone].    ATENCIÓN: Si habla español, tiene a alcaraz disposición servicios gratuitos de asistencia lingüística. Llame al [phone].    We comply with applicable federal civil rights laws and [place] laws. We do not discriminate on the basis of race, color, national origin, age, disability, sex, sexual orientation, or gender identity.            Thank you!     Thank you for choosing [facility]  for your care. Our goal is always to provide you with excellent care. Hearing back from our patients is one way we can continue to improve our services. Please take a few minutes to complete the written survey that you may receive in the mail after your visit with us. Thank you!             Your Updated Medication List - Protect others around you: Learn how to safely use, store and throw away your medicines at www.disposemymeds.org.          This list is accurate as of 5/15/18  3:19 PM.  Always use your most recent med list.                   Brand Name Dispense Instructions for use Diagnosis    Calcium Carbonate-Simethicone 500-125 MG Chew     120 tablet    Take 1 tablet by mouth 3 times daily as needed    Flatulence, eructation, and gas pain       TYLENOL PO      Take 500 mg by mouth every 4 hours as needed for mild pain or fever

## 2018-05-15 NOTE — PATIENT INSTRUCTIONS
1. Shingrex is a 2 shot series that prevents shingles 97% of the time, as opposed to the old shingles shot that only prevented it at 40-50%  It costs less for medicare at a pharmacy  You should get it starting at 50 yrs old     2.  Weight Loss Tips  1. Do not eat after 6 hrs before your expected bedtime  2. Have your heaviest meal for breakfast, a slightly lighter meal at lunch and a snack 6 hrs before bed  3. No sugar/calorie drinks except milk ie no fruit juice, pop, alcohol.  4. Drink milk 30min before meals to decrease your hunger. Also it is excellent as part of your last meal of the day snack  5. Drink lots of water  6. Increase fiber in diet: all bran cereal, salads, popcorn etc  7. Have only one small serving of fruit a day about 1/2 cup (as this is high in sugar)  8. EXERCISE is the bottom line. Without it, you will gain weight even on a low calorie diet. Best if done 2-3X a day as can    Being overweight contributes to high blood pressure and high cholesterol, both of which cause heart attacks, strokes and kidney failure, prediabetes and diabetes, arthritis, and liver disease     3. No difference was  Noted by patients in a double blind study when given codeine, tylenol ( acetaminophen) or ibuprofen (all in identical pills). They felt no difference in pain relief. Since ibuprofen and the NSAIDs  causes kidney damage, esophageal damage with heartburn, and can increase the risk of esophageal and stomach cancer and ulcers,and colonic strictures. They also cause increased risk of heart attack .   I recommend that you use tylenol(acetaminophen) for pain. Use the acetaminophen ES  Which has 500mgm/tablet You can take up to 2 tablets 4 times a day as need for pain.  If this is not enough, you can add in ibuprofen or aleve(naprosyn) with 2 glasses of fluid and some food-to protect the stomach and esophagus. Please let us know if you are continuing to take ibuprofen or aleve, as we will need to periodically check  your kidney function with a blood test.    4.  Run a cold air vaporizer as much as possible. If you cannot,  boil water and breath the warm vapors 2-3 times a day to try to open up the sinuses take 2400mgm of guaifenesin per 24 hours   You can do this by taking  Mucinex plain blue  1200 mg  One tablet twice a day (This may come as 600mg/tablet and you need to take 2 tabs twice a day) or you could buy the cheaper  generic 400mgm / tab and take 2 tablets 3 x a day or 1 and 1/2 tablets 4 x a day . .Guaifenesin is  the major component of most cough syrups, because it makes the mucus less thick, and therefore it drains out better and you are less likely to cough from it dripping on the back of your throat.  Irrigate the  nose with plain water under the kitchen sink faucet or the shower.  Ania pots, spray bottles, etc accumulate bacteria and are not recommended.   The tickle in the throat is also helped by gargling with vinegar and honey mixture, or pop or mouth wash as these coat the throat.  Please try to rinse teeth with water after using these .   Do not use sudafed or pseudephedrine as it dries the mucus up so it is harder to get it out, and it can raise your BP     5. See Dr Alan Marks of neurosurgery  [phone]    6. Recommend treat the Tbc so wont exacerbate

## 2018-05-15 NOTE — NURSING NOTE
"Chief Complaint   Patient presents with     Headache     /78  Pulse 67  Temp 97.5  F (36.4  C) (Tympanic)  Resp 18  Ht 5' 1\" (1.549 m)  Wt 201 lb (91.2 kg)  LMP  (LMP Unknown)  SpO2 99%  Breastfeeding? No  BMI 37.98 kg/m2 Estimated body mass index is 37.98 kg/(m^2) as calculated from the following:    Height as of this encounter: 5' 1\" (1.549 m).    Weight as of this encounter: 201 lb (91.2 kg).  BP completed using cuff size: fabrizio [place] CMA    There are no preventive care reminders to display for this patient.  Health Maintenance reviewed at today's visit patient asked to schedule/complete:   None, Health Maintenance up to date.    "

## 2018-05-15 NOTE — PROGRESS NOTES
SUBJECTIVE:   Lindsey Fuentes is a 52 year old female who presents to clinic today for the following health issues:    Headaches      Duration: x 4 days RT temporal     Hx of recurrent HAs bilat since Lt temp meningioma resected in 10-13    saw  neurosurg ,Dr Marks in 10-14 with neg MRI and told to rept in a yr,,, selena it in 5-16 and neg but no further f/u     Occur any time of yr, not just seasonally     Description  Location: unilateral in the right temporal area & nasal congestion on RT & feels this is the cause  Hx of recurrent HAs on both sides off& on  Character: throbbing pain  Frequency:  Daily  Duration:  x 4 days    Intensity:  severe    Accompanying signs and symptoms:    Precipitating or Alleviating factors:  Nausea/vomiting: no  Dizziness: occasionally  Weakness or numbness: no  Visual changes: none  Fever: no   Sinus or URI symptoms YES, Sinus Pressure    History  Head trauma: no  Family history of migraines: YES  Previous tests for headaches: no  Neurologist evaluations: no  Able to do daily activities when headache present: no  Wake with headaches: YES  Daily pain medication use: no   Any changes in: None    Precipitating or Alleviating factors (light/sound/sleep/caffeine): None    Therapies tried and outcome: Ibuprofen (Advil, Motrin)    Outcome - not effective  Frequent/daily pain medication use: no      NONMORBID OBESITY    _BMI= 38.1   -wt is increasing   -comorbid hi LDL     POSITIVE INTERFERON GOLD     -in 9-17   -CXR 12-17 : calcified nodules in RUL   -unknown hx BCG - tho lived in [facility]   -conts to return to [facility] which has a hi occurrence of Tbc   -was  Told no Rx needed       MIxed Hyperlipidemia Follow-Up      Rate your low fat/cholesterol diet?: not monitoring fat    Taking statin?  No    Other lipid medications/supplements?:  none      Problem list and histories reviewed & adjusted, as indicated.  Additional history: as documented    Labs reviewed in EPIC    Reviewed and updated as  "needed this visit by clinical staff       Reviewed and updated as needed this visit by Provider         ROS:  CONSTITUTIONAL: NEGATIVE for fever, chills, change in weight  INTEGUMENTARY/SKIN: NEGATIVE for worrisome rashes, moles or lesions  EYES: NEGATIVE for vision changes or irritation  ENT/MOUTH: NEGATIVE for ear, mouth and throat problems  RESP: NEGATIVE for significant cough or SOB  BREAST: NEGATIVE for masses, tenderness or discharge  CV: NEGATIVE for chest pain, palpitations or peripheral edema  GI: NEGATIVE for nausea, abdominal pain, heartburn, or change in bowel habits  : NEGATIVE for frequency, dysuria, or hematuria  MUSCULOSKELETAL: NEGATIVE for significant arthralgias or myalgia  NEURO: NEGATIVE for weakness, dizziness or paresthesias : HA   ENDOCRINE: NEGATIVE for temperature intolerance, skin/hair changes  HEME: NEGATIVE for bleeding problems  PSYCHIATRIC: NEGATIVE for changes in mood or affect    OBJECTIVE:     /78  Pulse 67  Temp 97.5  F (36.4  C) (Tympanic)  Resp 18  Ht 5' 1\" (1.549 m)  Wt 201 lb (91.2 kg)  LMP  (LMP Unknown)  SpO2 99%  Breastfeeding? No  BMI 37.98 kg/m2  Body mass index is 37.98 kg/(m^2).  GENERAL: healthy, alert, no distress and obese  EYES: Eyes grossly normal to inspection, PERRL and conjunctivae and sclerae normal  RESP: lungs clear to auscultation - no rales, rhonchi or wheezes  CV: regular rate and rhythm, normal S1 S2, no S3 or S4, no murmur, click or rub, no peripheral edema and peripheral pulses strong  MS: no gross musculoskeletal defects noted, no edema  SKIN: no suspicious lesions or rashes  NEURO: Normal strength and tone, mentation intact and speech normal  PSYCH: mentation appears normal, affect normal/bright    Diagnostic Test Results:  Results for orders placed or performed during the hospital encounter of 01/15/18   COLONOSCOPY   Result Value Ref Range    COLONOSCOPY       [facility] " Department  _______________________________________________________________________________  Patient Name: Lindsey Fuentes         Procedure Date: 1/15/2018 3:03 PM  MRN: [id number]                       Account Number: [id number]  YOB: 1965              Admit Type: Outpatient  Age: 52                               Room: 1                          Note Status: Finalized                Attending MD: Kaveh Raymundo MD  Total Sedation Time:                  Instrument Name: 309 PCF- Colonoscope  _______________________________________________________________________________     Procedure:                Colonoscopy  Indications:              Screening for colorectal malignant neoplasm  Providers:                Kaveh Raymundo MD, Kamala Duffy RN  Referring MD:             Maria Guadalupe Serrano MD  Medicines:                Midazolam 4 mg IV, Fentanyl 100 micrograms IV  Complications:            No immediate complications.  __ _____________________________________________________________________________  Procedure:                Pre-Anesthesia Assessment:                            - Prior to the procedure, a History and Physical                             was performed, and patient medications and                             allergies were reviewed. The patient is competent.                             The risks and benefits of the procedure and the                             sedation options and risks were discussed with the                             patient. All questions were answered and informed                             consent was obtained. Patient identification and                             proposed procedure were verified by the physician                             in the pre-procedure area. Mental Status                             Examination: alert and oriented. Airway                             Examination: normal oropharyngeal airway and neck                              mobility. R espiratory Examination: clear to                             auscultation. CV Examination: normal. Prophylactic                             Antibiotics: The patient does not require                             prophylactic antibiotics. Prior Anticoagulants: The                             patient has taken no previous anticoagulant or                             antiplatelet agents. ASA Grade Assessment: II - A                             patient with mild systemic disease. After reviewing                             the risks and benefits, the patient was deemed in                             satisfactory condition to undergo the procedure.                             The anesthesia plan was to use moderate sedation /                             analgesia (conscious sedation). Immediately prior                             to administration of medications, the patient was                             re-assessed for adequacy to receive sedatives. The                             heart ra te, respiratory rate, oxygen saturations,                             blood pressure, adequacy of pulmonary ventilation,                             and response to care were monitored throughout the                             procedure. The physical status of the patient was                             re-assessed after the procedure.                            After obtaining informed consent, the colonoscope                             was passed under direct vision. Throughout the                             procedure, the patient's blood pressure, pulse, and                             oxygen saturations were monitored continuously. The                             Colonoscope was introduced through the anus with                             the intention of advancing to the cecum. The scope                             was advanced to the transverse colon before the                             procedure was aborted.  Medications were given. The                             colonoscop y was performed without difficulty. The                             patient tolerated the procedure well. The quality                             of the bowel preparation was poor.                                                                                   Findings:       The perianal and digital rectal examinations were normal.       Extensive amounts of copious quantities of semi-solid solid stool was        found in the entire colon, precluding visualization. Lavage of the area        was performed using copious amounts of sterile water, resulting in        incomplete clearance with continued poor visualization.       Internal Hemorrhoids seen on retroflexion                                                                                   Impression:               - Preparation of the colon was poor.                            - Stool in the entire examined colon.                            - No specimens collected.  Recommendation:           - Repeat colonoscopy because the  bowel preparation                             was poor.                            - Please continue to follow with Primary care                             Physician.                                                                                   Procedure Code(s):       --- Professional ---       75586, 53, Colonoscopy, flexible; diagnostic, including collection of        specimen(s) by brushing or washing, when performed (separate procedure)  Diagnosis Code(s):       --- Professional ---       Z12.11, Encounter for screening for malignant neoplasm of colon    CPT copyright 2016 [employer]. All rights reserved.    The codes documented in this report are preliminary and upon  review may   be revised to meet current compliance requirements.    __________________  Kaveh Raymundo MD  1/15/2018 3:35:12 PM  I was physically present for the entire  viewing portion of the exam.  Kaveh Raymundo MD  Number of Addenda: 0    Note Initiated On: 1/15/2018 3:03 PM  MRN:                       [id number]  Procedure Date:           1/15/2018 3:03:18 PM  Total Procedure Duration: 0 hours 5 minutes 4 seconds   Estimated Blood Loss:       Scope In: 3:24:25 PM  Scope Out: 3:29:29 PM         ASSESSMENT/PLAN:               ICD-10-CM    1. Episodic tension-type headache, not intractable Rt temporal to Lt side since 2014 tumor  G44.219    2. Meningioma (H) 10-14 s/po Lt frontotemp craniotomy  D32.9    3. Positive QuantiFERON-TB Gold test R76.12    4. Abnormal CXR with multiple calcified nodules RUL  12-17 R91.8    5. Mixed hyperlipidemia E78.2    6. Class 2 obesity due to excess calories with serious comorbidity and body mass index (BMI) of 38.0 to 38.9 in adult E66.01     Z68.38        Patient Instructions   1. Shingrex is a 2 shot series that prevents shingles 97% of the time, as opposed to the old shingles shot that only prevented it at 40-50%  It costs less for medicare at a pharmacy  You should get it starting at 50 yrs old     2.  Weight Loss Tips  1. Do not eat after 6 hrs before your expected bedtime  2. Have your heaviest meal for breakfast, a slightly lighter meal at lunch and a snack 6 hrs before bed  3. No sugar/calorie drinks except milk ie no fruit juice, pop, alcohol.  4. Drink milk 30min before meals to decrease your hunger. Also it is excellent as part of your last meal of the day snack  5. Drink lots of water  6. Increase fiber in diet: all bran cereal, salads, popcorn etc  7. Have only one small serving of fruit a day about 1/2 cup (as this is high in sugar)  8. EXERCISE is the bottom line. Without it, you will gain weight even on a low calorie diet. Best if done 2-3X a day as can    Being overweight contributes to high blood pressure and high cholesterol, both of which cause heart attacks, strokes and kidney failure, prediabetes and diabetes, arthritis, and  liver disease     3. No difference was  Noted by patients in a double blind study when given codeine, tylenol ( acetaminophen) or ibuprofen (all in identical pills). They felt no difference in pain relief. Since ibuprofen and the NSAIDs  causes kidney damage, esophageal damage with heartburn, and can increase the risk of esophageal and stomach cancer and ulcers,and colonic strictures. They also cause increased risk of heart attack .   I recommend that you use tylenol(acetaminophen) for pain. Use the acetaminophen ES  Which has 500mgm/tablet You can take up to 2 tablets 4 times a day as need for pain.  If this is not enough, you can add in ibuprofen or aleve(naprosyn) with 2 glasses of fluid and some food-to protect the stomach and esophagus. Please let us know if you are continuing to take ibuprofen or aleve, as we will need to periodically check your kidney function with a blood test.    4.  Run a cold air vaporizer as much as possible. If you cannot,  boil water and breath the warm vapors 2-3 times a day to try to open up the sinuses take 2400mgm of guaifenesin per 24 hours   You can do this by taking  Mucinex plain blue  1200 mg  One tablet twice a day (This may come as 600mg/tablet and you need to take 2 tabs twice a day) or you could buy the cheaper  generic 400mgm / tab and take 2 tablets 3 x a day or 1 and 1/2 tablets 4 x a day . .Guaifenesin is  the major component of most cough syrups, because it makes the mucus less thick, and therefore it drains out better and you are less likely to cough from it dripping on the back of your throat.  Irrigate the  nose with plain water under the kitchen sink faucet or the shower.  Ania pots, spray bottles, etc accumulate bacteria and are not recommended.   The tickle in the throat is also helped by gargling with vinegar and honey mixture, or pop or mouth wash as these coat the throat.  Please try to rinse teeth with water after using these .   Do not use sudafed or  pseudephedrine as it dries the mucus up so it is harder to get it out, and it can raise your BP     5. See Dr Alan Miller of neurosurgery  [phone]    6. Recommend treat the Tbc so wont tigist Gomez MD  [facility]    Weight management plan: Discussed healthy diet and exercise guidelines and patient will follow up in 3 months in clinic to re-evaluate.      discussed with pt   1. hsa a + interferon gold , with calcified in RUL per 12-17 CXR   Never treated   It is highly recommended that she be treated so this inactive Tbc will not become active in the future with illness,  steoids etc      2. She has had a meningioma    Tho benign , these tend to recur   With her recurrent HA, s , I feel she should have a MRI   She at least states she'll go back to see neurosurg in f/u - Dr Miller    3. Will see if can ease the nasal congestion with the vapor etc as above as pt feels that this is what gives her her RUIZ s     Danielle Gomez MD

## 2018-09-17 ENCOUNTER — OFFICE VISIT (OUTPATIENT)
Dept: FAMILY MEDICINE | Facility: CLINIC | Age: 53
End: 2018-09-17
Payer: COMMERCIAL

## 2018-09-17 VITALS
OXYGEN SATURATION: 97 % | SYSTOLIC BLOOD PRESSURE: 111 MMHG | HEIGHT: 61 IN | BODY MASS INDEX: 38.14 KG/M2 | HEART RATE: 66 BPM | DIASTOLIC BLOOD PRESSURE: 79 MMHG | WEIGHT: 202 LBS | TEMPERATURE: 97.4 F

## 2018-09-17 DIAGNOSIS — Z23 NEED FOR PROPHYLACTIC VACCINATION AND INOCULATION AGAINST INFLUENZA: ICD-10-CM

## 2018-09-17 DIAGNOSIS — M70.62 TROCHANTERIC BURSITIS OF LEFT HIP: Primary | ICD-10-CM

## 2018-09-17 DIAGNOSIS — R31.29 MICROSCOPIC HEMATURIA: ICD-10-CM

## 2018-09-17 DIAGNOSIS — M53.3 SACROILIAC JOINT PAIN: ICD-10-CM

## 2018-09-17 LAB
ALBUMIN UR-MCNC: ABNORMAL MG/DL
APPEARANCE UR: CLEAR
BACTERIA #/AREA URNS HPF: ABNORMAL /HPF
BILIRUB UR QL STRIP: NEGATIVE
COLOR UR AUTO: YELLOW
GLUCOSE UR STRIP-MCNC: NEGATIVE MG/DL
HGB UR QL STRIP: ABNORMAL
KETONES UR STRIP-MCNC: NEGATIVE MG/DL
LEUKOCYTE ESTERASE UR QL STRIP: ABNORMAL
NITRATE UR QL: NEGATIVE
NON-SQ EPI CELLS #/AREA URNS LPF: ABNORMAL /LPF
PH UR STRIP: 6 PH (ref 5–7)
RBC #/AREA URNS AUTO: ABNORMAL /HPF
SOURCE: ABNORMAL
SP GR UR STRIP: <=1.005 (ref 1–1.03)
UROBILINOGEN UR STRIP-ACNC: 0.2 EU/DL (ref 0.2–1)
WBC #/AREA URNS AUTO: ABNORMAL /HPF

## 2018-09-17 PROCEDURE — 99213 OFFICE O/P EST LOW 20 MIN: CPT | Mod: 25 | Performed by: INTERNAL MEDICINE

## 2018-09-17 PROCEDURE — 81001 URINALYSIS AUTO W/SCOPE: CPT | Performed by: INTERNAL MEDICINE

## 2018-09-17 PROCEDURE — 90471 IMMUNIZATION ADMIN: CPT | Performed by: INTERNAL MEDICINE

## 2018-09-17 PROCEDURE — 90686 IIV4 VACC NO PRSV 0.5 ML IM: CPT | Performed by: INTERNAL MEDICINE

## 2018-09-17 NOTE — LETTER
[address] [facility]  [address]  [place]  [address]  Tel: [phone]    September 18, 2018    Lindsey Fuentes  [address]  [place] [address]        Yony Portillo,    This is to inform you regarding your test result.    Urine test result is  Satisfactory .    Sincerely,    Dr.Nasima Zach MD,FACP  [facility]      Resulted Orders   UA with Microscopic reflex to Culture   Result Value Ref Range    Color Urine Yellow     Appearance Urine Clear     Glucose Urine Negative NEG^Negative mg/dL    Bilirubin Urine Negative NEG^Negative    Ketones Urine Negative NEG^Negative mg/dL    Specific Gravity Urine <=1.005 1.003 - 1.035    pH Urine 6.0 5.0 - 7.0 pH    Protein Albumin Urine Trace (A) NEG^Negative mg/dL    Urobilinogen Urine 0.2 0.2 - 1.0 EU/dL    Nitrite Urine Negative NEG^Negative    Blood Urine Moderate (A) NEG^Negative    Leukocyte Esterase Urine Trace (A) NEG^Negative    Source Midstream Urine     WBC Urine 0 - 5 OTO5^0 - 5 /HPF    RBC Urine O - 2 OTO2^O - 2 /HPF    Squamous Epithelial /LPF Urine Few FEW^Few /LPF    Bacteria Urine Few (A) NEG^Negative /HPF

## 2018-09-17 NOTE — PROGRESS NOTES
"  SUBJECTIVE:   Lindsey Fuentes is a 53 year old female who presents to clinic today for the following health issues:    Musculoskeletal problem/pain      Duration: x 3 weeks     Description  Location: Left hip    Intensity:  mild    Accompanying signs and symptoms: none    History  Previous similar problem: YES- on/off x 1 year ago  Previous evaluation:  none    Precipitating or alleviating factors:  Trauma or overuse: no   Aggravating factors include: standing, walking, climbing stairs and bothers patient in her sleep when she turns and shifts.     Therapies tried and outcome: massage oil, ibuprofen. SX's still present.       Patient reports left hip pain  Aggravated by turning and adduction of hip  Denies injury, strenuous activity    Dry cough  Reports that she has just gotten over a URTI  Dry cough is persisting but improving slowly  Was worse at night    Problem list and histories reviewed & adjusted, as indicated.  Additional history: as documented    Medications and Labs reviewed in EPIC    Reviewed and updated as needed this visit by clinical staff  Tobacco  Allergies  Meds  Soc Hx      Reviewed and updated as needed this visit by Provider       ROS:  Constitutional, HEENT, cardiovascular, pulmonary, GI, , musculoskeletal, neuro, skin, endocrine and psych systems are negative, except as otherwise noted.    POSITIVE for left hip pain  POSITIVE for cough    This document serves as a record of the services and decisions personally performed and made by Maria Guadalupe Serrano MD. It was created on her behalf by Leigha Bello, a trained medical scribe. The creation of this document is based on the provider's statements to the medical scribe.  Leigha Bello 2:07 PM September 17, 2018    OBJECTIVE:     /79 (BP Location: Right arm, Patient Position: Sitting, Cuff Size: Adult Large)  Pulse 66  Temp 97.4  F (36.3  C) (Oral)  Ht 1.549 m (5' 1\")  Wt 91.6 kg (202 lb)  SpO2 97%  Breastfeeding? No  BMI " 38.17 kg/m2  Body mass index is 38.17 kg/(m^2).    GENERAL: healthy, alert and no distress  MS: no gross musculoskeletal defects noted, no edema, freely mobile  Comprehensive back pain exam:  Straight leg raise negative bilaterally  PSYCH: mentation appears normal, affect normal/bright    Diagnostic Test Results:  No results found for this or any previous visit (from the past 24 hour(s)).    Reviewed and discussed colonoscopy done on 01/15/18  Reviewed and discussed CT abdomen done on 05/11/16  Reviewed and discussed cystoscopy done on 05/31/16  ASSESSMENT/PLAN:     Lindsey was seen today for musculoskeletal problem.    Diagnoses and all orders for this visit:    Trochanteric bursitis of left hip  -     [facility]  Patient reports left hip for the past 3 weeks  Pain is mild but aggravated by activity  Adduction and rotation of hips exacerbates it  Denies injury, strenuous activity  Exam was largely insignficant  Patient is able to walk  Explained to patient that this is likely bursitis  Advised use of NSAIDs with food to help her pain  Referred to PT  She is will schedule appointment at her convenience  If symptoms persist, I will refer to a specialist    Sacroiliac joint pain  Comments:  left  Orders:  -     [facility]  See above  Imaging and referral if no improvement.    Microscopic hematuria  -     UA with Microscopic reflex to Culture  Patient has had history of microscopic hematuria  Re-check today  Had work up in the past.    Patient Instructions   Labs and urine analysis today  Flu shot today  Use ibuprofen, motrin, or aleve to help with your bursitis  Take it with food  Schedule an appointment for physical therapy at your earliest convenience  Check with our pharmacy located in [address] about your insurance company coverage for new Shingles vaccine, which is now available  It's called SHINGRIX and is a series of two shots, 2-6 months apart  It is  considered more than 90% effective  Follow up in January for physical  Seek sooner medical attention if there is any worsening of symptoms or problems    The information in this document, created by the medical scribe for me, accurately reflects the services I personally performed and the decisions made by me. I have reviewed and approved this document for accuracy prior to leaving the patient care area.  September 17, 2018 2:18 PM    Maria Guadalupe Serrano MD  [facility]

## 2018-09-17 NOTE — MR AVS SNAPSHOT
After Visit Summary   9/17/2018    Lindsey Fuentes    MRN: [id number]           Patient Information     Date Of Birth          1965        Visit Information        Provider Department      9/17/2018 2:00 PM Maria Guadalupe Serrano MD [facility]        Today's Diagnoses     Trochanteric bursitis of left hip    -  1    Sacroiliac joint pain        Microscopic hematuria          Care Instructions    Labs and urine analysis today  Flu shot today  Use ibuprofen, motrin, or aleve to help with your bursitis  Take it with food  Schedule an appointment for physical therapy at your earliest convenience  Check with our pharmacy located in [address] about your insurance company coverage for new Shingles vaccine, which is now available  It's called SHINGRIX and is a series of two shots, 2-6 months apart  It is considered more than 90% effective  Follow up in January for physical  Seek sooner medical attention if there is any worsening of symptoms or problems          Follow-ups after your visit        Additional Services     [facility] PT, HAND, AND CHIROPRACTIC REFERRAL       **This order will print in the [facility] Scheduling Office**    Physical Therapy, Hand Therapy and Chiropractic Care are available through:    *[facility]  *[facility]  *[facility]    Call one number to schedule at any of the above locations: [phone].    Your provider has referred you to: Chiropractic at [facility] or [facility]    Indication/Reason for Referral: Hip Pain and sacroiliac joint pain  Onset of Illness: acute  Therapy Orders: Evaluate and Treat  Special Programs: None  Special Request: None    Casandra Guajardo      Additional Comments for the Therapist or Chiropractor:     Please be aware that coverage of these services is subject to the terms and limitations of your health insurance plan.  Call member services at your health plan with any benefit or coverage questions.      Please  "bring the following to your appointment:    *Your personal calendar for scheduling future appointments  *Comfortable clothing                  Follow-up notes from your care team     Return in about 4 months (around 2019) for Physical Exam.      Who to contact     If you have questions or need follow up information about today's clinic visit or your schedule please contact [facility] directly at [phone].  Normal or non-critical lab and imaging results will be communicated to you by MyChart, letter or phone within 4 business days after the clinic has received the results. If you do not hear from us within 7 days, please contact the clinic through [employer] or phone. If you have a critical or abnormal lab result, we will notify you by phone as soon as possible.  Submit refill requests through shenzhoufu or call your pharmacy and they will forward the refill request to us. Please allow 3 business days for your refill to be completed.          Additional Information About Your Visit        [employer] Recruiting Information     shenzhoufu lets you send messages to your doctor, view your test results, renew your prescriptions, schedule appointments and more. To sign up, go to [email or web address] . Click on \"Log in\" on the left side of the screen, which will take you to the Welcome page. Then click on \"Sign up Now\" on the right side of the page.     You will be asked to enter the access code listed below, as well as some personal information. Please follow the directions to create your username and password.     Your access code is: [email or web address]  Expires: 2018 12:48 PM     Your access code will  in 90 days. If you need help or a new code, please call your [facility] clinic or [phone].        Care EveryWhere ID     This is your Care EveryWhere ID. This could be used by other organizations to access your [facility] medical records  [id number]        Your Vitals Were     Pulse Temperature Height Pulse " "Oximetry Breastfeeding? BMI (Body Mass Index)    66 97.4  F (36.3  C) (Oral) 5' 1\" (1.549 m) 97% No 38.17 kg/m2       Blood Pressure from Last 3 Encounters:   09/17/18 111/79   05/15/18 124/78   01/15/18 127/84    Weight from Last 3 Encounters:   09/17/18 202 lb (91.6 kg)   05/15/18 201 lb (91.2 kg)   01/15/18 198 lb (89.8 kg)              We Performed the Following     [facility] PT, HAND, AND CHIROPRACTIC REFERRAL     UA with Microscopic reflex to Culture        Primary Care Provider Office Phone # Fax #    Maria Guadalupe Serrano [phone] [address] [place] [address]        Equal Access to Services     [facility] : Hadii deon Arguello, waaxda donavan, qaybta kaalmaluz carty, edith limon . So [facility] [phone].    ATENCIÓN: Si habla español, tiene a alcaraz disposición servicios gratuitos de asistencia lingüística. Esther al [phone].    We comply with applicable federal civil rights laws and [place] laws. We do not discriminate on the basis of race, color, national origin, age, disability, sex, sexual orientation, or gender identity.            Thank you!     Thank you for choosing [facility]  for your care. Our goal is always to provide you with excellent care. Hearing back from our patients is one way we can continue to improve our services. Please take a few minutes to complete the written survey that you may receive in the mail after your visit with us. Thank you!             Your Updated Medication List - Protect others around you: Learn how to safely use, store and throw away your medicines at www.disposemymeds.org.          This list is accurate as of 9/17/18  2:17 PM.  Always use your most recent med list.                   Brand Name Dispense Instructions for use Diagnosis    Calcium Carbonate-Simethicone 500-125 MG Chew     120 tablet    Take 1 tablet by mouth 3 times daily as needed    Flatulence, " eructation, and gas pain       TYLENOL PO      Take 500 mg by mouth every 4 hours as needed for mild pain or fever

## 2018-09-17 NOTE — PROGRESS NOTES

## 2018-09-17 NOTE — NURSING NOTE
Prior to injection verified patient identity using patient's name and date of birth.  Due to injection administration, patient instructed to remain in clinic for 15 minutes  afterwards, and to report any adverse reaction to me immediately.    Screening Questionnaire for Adult Immunization    Are you sick today?   No   Do you have allergies to medications, food, a vaccine component or latex?   No   Have you ever had a serious reaction after receiving a vaccination?   No   Do you have a long-term health problem with heart disease, lung disease, asthma, kidney disease, metabolic disease (e.g. diabetes), anemia, or other blood disorder?   No   Do you have cancer, leukemia, HIV/AIDS, or any other immune system problem?   No   In the past 3 months, have you taken medications that affect  your immune system, such as prednisone, other steroids, or anticancer drugs; drugs for the treatment of rheumatoid arthritis, Crohn s disease, or psoriasis; or have you had radiation treatments?   No   Have you had a seizure, or a brain or other nervous system problem?   No   During the past year, have you received a transfusion of blood or blood     products, or been given immune (gamma) globulin or antiviral drug?   No   For women: Are you pregnant or is there a chance you could become        pregnant during the next month?   No   Have you received any vaccinations in the past 4 weeks?   No     Immunization questionnaire answers were all negative.        Per orders of Dr. Serrano, injection of Flu shot given by Adrienne Nation. Patient instructed to remain in clinic for 15 minutes afterwards, and to report any adverse reaction to me immediately.       Screening performed by Adrienne Nation on 9/17/2018 at 2:32 PM.

## 2018-09-17 NOTE — PATIENT INSTRUCTIONS
Labs and urine analysis today  Flu shot today  Use ibuprofen, motrin, or aleve to help with your bursitis  Take it with food  Schedule an appointment for physical therapy at your earliest convenience  Check with our pharmacy located in [address] about your insurance company coverage for new Shingles vaccine, which is now available  It's called SHINGRIX and is a series of two shots, 2-6 months apart  It is considered more than 90% effective  Follow up in January for physical  Seek sooner medical attention if there is any worsening of symptoms or problems

## 2018-09-18 NOTE — PROGRESS NOTES
Please notify patient by sending following letter with copy of test results      Yony Portillo,    This is to inform you regarding your test result.    Urine test result is  Satisfactory .    Sincerely,      Dr.Nasima Zach MD,FACP

## 2018-09-24 ENCOUNTER — THERAPY VISIT (OUTPATIENT)
Dept: PHYSICAL THERAPY | Facility: CLINIC | Age: 53
End: 2018-09-24
Payer: COMMERCIAL

## 2018-09-24 DIAGNOSIS — M25.552 HIP PAIN, LEFT: ICD-10-CM

## 2018-09-24 PROCEDURE — G8978 MOBILITY CURRENT STATUS: HCPCS | Mod: GP | Performed by: PHYSICAL THERAPIST

## 2018-09-24 PROCEDURE — G8979 MOBILITY GOAL STATUS: HCPCS | Mod: GP | Performed by: PHYSICAL THERAPIST

## 2018-09-24 PROCEDURE — 97161 PT EVAL LOW COMPLEX 20 MIN: CPT | Mod: GP | Performed by: PHYSICAL THERAPIST

## 2018-09-24 PROCEDURE — 97110 THERAPEUTIC EXERCISES: CPT | Mod: GP | Performed by: PHYSICAL THERAPIST

## 2018-09-24 ASSESSMENT — ACTIVITIES OF DAILY LIVING (ADL)
WALKING_DOWN_STEEP_HILLS: NO DIFFICULTY AT ALL
HOW_WOULD_YOU_RATE_YOUR_CURRENT_LEVEL_OF_FUNCTION_DURING_YOUR_USUAL_ACTIVITIES_OF_DAILY_LIVING_FROM_0_TO_100_WITH_100_BEING_YOUR_LEVEL_OF_FUNCTION_PRIOR_TO_YOUR_HIP_PROBLEM_AND_0_BEING_THE_INABILITY_TO_PERFORM_ANY_OF_YOUR_USUAL_DAILY_ACTIVITIES?: 80
GOING_DOWN_1_FLIGHT_OF_STAIRS: NO DIFFICULTY AT ALL
HEAVY_WORK: SLIGHT DIFFICULTY
PUTTING_ON_SOCKS_AND_SHOES: NO DIFFICULTY AT ALL
STANDING_FOR_15_MINUTES: NO DIFFICULTY AT ALL
LIGHT_TO_MODERATE_WORK: SLIGHT DIFFICULTY
HOS_ADL_HIGHEST_POTENTIAL_SCORE: 60
STEPPING_UP_AND_DOWN_CURBS: NO DIFFICULTY AT ALL
HOS_ADL_ITEM_SCORE_TOTAL: 55
HOS_ADL_COUNT: 15
WALKING_INITIALLY: SLIGHT DIFFICULTY
GETTING_INTO_AND_OUT_OF_A_BATHTUB: NO DIFFICULTY AT ALL
RECREATIONAL_ACTIVITIES: SLIGHT DIFFICULTY
GETTING_INTO_AND_OUT_OF_AN_AVERAGE_CAR: NO DIFFICULTY AT ALL
ROLLING_OVER_IN_BED: SLIGHT DIFFICULTY
WALKING_15_MINUTES_OR_GREATER: NO DIFFICULTY AT ALL
SITTING_FOR_15_MINUTES: NO DIFFICULTY AT ALL
HOS_ADL_SCORE(%): 91.67
WALKING_APPROXIMATELY_10_MINUTES: NO DIFFICULTY AT ALL
WALKING_UP_STEEP_HILLS: NO DIFFICULTY AT ALL
GOING_UP_1_FLIGHT_OF_STAIRS: NO DIFFICULTY AT ALL

## 2018-09-24 NOTE — PROGRESS NOTES
"[facility] Initial Evaluation  Initial Evaluation   Subjective:  PRIMARY COMPLAINT/HISTORY:   Patient reports left sided hip and buttocks pain. Patient denies back pain or a history of back pain, popping, clicking, locking and stiffness in the hip. Patient also denies any numbness or tingling into the left leg/feet.  ONSET: About 3 months ago, June 2018   HISTORY OF PRESENT ILLNESS/MECHANISM OF INJURY: insidous     PRIOR LEVEL OF FUNCTION: No pain limitations with sleeping on the left side at night, no pain limitations in the evenings, able to stand ad jian without pain  CURRENT LEVEL OF FUNCTION: pain limitations with sleeping on the left side, walking 2-3 blocks and then can have pain in the hip/buttocks, standing for hours then will feel pain at night time   OCCUPATION/WORK STATUS: \"Unemployed\", completes prolonged standing  GENERAL HEALTH:  good  RECREATIONAL ACTIVITY/EXERCISE: N/a    PAIN: 3/10  CONSTANT/INTERMITTENT: intermittent   QUALITY:  ache  STATUS:   Unchanging   MANAGEMENT TO DATE: rubbing with creame of some benefit, resting, position modification  AGGRAVATING FACTORS: sleeping on the left side, evenings > mornings  ALLEVIATING FACTORS: rubbing with cream, resting of some benefit, position modifications  SLEEP:   Waking up at night, painful left side lying, able to return to sleep  MEDICAL SCREENING     PAST MEDICAL HISTORY:  Headaches, 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 Sleep on the left side at night time without pain limitations        Objective:  GAIT: minimal trunk rotation bilaterally  FUNCTIONAL MOVEMENTS:  Squat: Dynamic valgus bilaterally, closing the chain with thighs contacting each other, knees tracking over the toes   Single leg stance right: 5 seconds, no effect, minimal trunk compensation with hands positioned outwards for counter balance  Single leg stance left: (+) < 5 seconds, " "increased trunk compensation.     HIP:    PROM L  PROM R AROM L AROM R MMT L MMT R   Flexion WNL, no effect WNL, no effect   4/5, no effect 4/5, no effect   Extension     3+/5, (+) 4-/5, no effect    Abduction WNL, no effect WNL, no effect   3+/5, (+) 4-/5, no effect    Adduction         ER WNL, \"pulling into left buttocks\" WNL, no effect       IR Min limitations, no effect  Min limitations, no effect         PALPATION: (+) left greater trochanter, left medial sacral ligaments, left glute medius   SPECIAL TESTS AND OTHER TESTS:  Scour:  ( + )L    ( - )R  ADRIANNE:  ( - )L    ( - )R      ASSESSMENT/PLAN  Patient is a 53 year old female with left side hip complaints.    Patient has the following significant findings with corresponding treatment plan.                Diagnosis 1:  Left hip pain  Pain -  manual therapy, splint/taping/bracing/orthotics, self management, education, directional preference exercise and home program  Decreased ROM/flexibility - manual therapy and therapeutic exercise  Decreased joint mobility - manual therapy and therapeutic exercise  Decreased strength - therapeutic exercise and therapeutic activities  Impaired balance - neuro re-education and therapeutic activities  Impaired gait - gait training    Therapy Evaluation Codes:   1) History comprised of:   Personal factors that impact the plan of care:      None.    Comorbidity factors that impact the plan of care are:      None.     Medications impacting care: None.  2) Examination of Body Systems comprised of:   Body structures and functions that impact the plan of care:      Hip.   Activity limitations that impact the plan of care are:      Standing, Walking, Sleeping and Laying down.  3) Clinical presentation characteristics are:   Stable/Uncomplicated.  4) Decision-Making    Low complexity using standardized patient assessment instrument and/or measureable assessment of functional outcome.  Cumulative Therapy Evaluation is: Low " complexity.    Previous and current functional limitations:  (See Goal Flow Sheet for this information)    Short term and Long term goals: (See Goal Flow Sheet for this information)     Communication ability:  Patient appears to be able to clearly communicate and understand verbal and written communication and follow directions correctly.  Treatment Explanation - The following has been discussed with the patient:   RX ordered/plan of care  Anticipated outcomes  Possible risks and side effects  This patient would benefit from PT intervention to resume normal activities.   Rehab potential is good.    Frequency:  1 X week, once daily  Duration:  for 6 weeks  Discharge Plan:  Achieve all LTG.  Independent in home treatment program.  Reach maximal therapeutic benefit.    Please refer to the daily flowsheet for treatment today, total treatment time and time spent performing 1:1 timed codes.     HPI                      System    Physical Exam    General     ROS

## 2018-12-13 NOTE — PROGRESS NOTES
ASSESSMENT/PLAN:   DISCHARGE REPORT  Patient has not returned to physical therapy, therefore, we are unable to assess patient's progress.  Current status is unknown and we are unable to assess goals, discharge G codes and functional outcome measures cannot be reported.  We would be happy to work with the patient if they were to return in the future. Patient will be discharged from physical therapy as skilled physical therapy is no longer indicated.

## 2019-01-03 PROBLEM — M25.552 HIP PAIN, LEFT: Status: RESOLVED | Noted: 2018-09-24 | Resolved: 2019-01-03

## 2019-01-14 ENCOUNTER — THERAPY VISIT (OUTPATIENT)
Dept: PHYSICAL THERAPY | Facility: CLINIC | Age: 54
End: 2019-01-14
Payer: COMMERCIAL

## 2019-01-14 ENCOUNTER — HOSPITAL ENCOUNTER (OUTPATIENT)
Dept: MAMMOGRAPHY | Facility: CLINIC | Age: 54
Discharge: HOME | End: 2019-01-14
Attending: INTERNAL MEDICINE | Admitting: INTERNAL MEDICINE
Payer: COMMERCIAL

## 2019-01-14 DIAGNOSIS — M54.50 LUMBAGO: Primary | ICD-10-CM

## 2019-01-14 DIAGNOSIS — M25.552 HIP PAIN, LEFT: ICD-10-CM

## 2019-01-14 DIAGNOSIS — Z12.39 BREAST CANCER SCREENING: ICD-10-CM

## 2019-01-14 PROCEDURE — G8978 MOBILITY CURRENT STATUS: HCPCS | Mod: GP | Performed by: PHYSICAL THERAPIST

## 2019-01-14 PROCEDURE — 97161 PT EVAL LOW COMPLEX 20 MIN: CPT | Mod: GP | Performed by: PHYSICAL THERAPIST

## 2019-01-14 PROCEDURE — 77067 SCR MAMMO BI INCL CAD: CPT

## 2019-01-14 PROCEDURE — G8979 MOBILITY GOAL STATUS: HCPCS | Mod: GP | Performed by: PHYSICAL THERAPIST

## 2019-01-14 PROCEDURE — 97110 THERAPEUTIC EXERCISES: CPT | Mod: GP | Performed by: PHYSICAL THERAPIST

## 2019-01-14 NOTE — PROGRESS NOTES
"[facility] Initial Evaluation  Initial Evaluation   Subjective:  PRIMARY COMPLAINT/HISTORY:   Patient reports left sided hip and buttocks pain.Patient also denies any numbness or tingling into the left leg/feet. Patient denies any lateral hip pain today.  ONSET: June 2018   HISTORY OF PRESENT ILLNESS/MECHANISM OF INJURY: insidous, patient has had pain with bending forward intermittently for \"maybe 2 years.\"    PRIOR LEVEL OF FUNCTION: No pain limitations with sleeping on the left side at night, no pain limitations in the evenings, able to stand ad jian without pain  CURRENT LEVEL OF FUNCTION: pain limitations with sleeping on the left side, walking 2-3 blocks and then can have pain in the hip/buttocks, standing for hours then will feel pain at night time   OCCUPATION/WORK STATUS: \"Unemployed\", completes prolonged standing  GENERAL HEALTH:  good  RECREATIONAL ACTIVITY/EXERCISE: N/a    PAIN: 3/10  CONSTANT/INTERMITTENT: intermittent   QUALITY:  ache  STATUS:   Improving  MANAGEMENT TO DATE: rubbing with creame of some benefit, resting, position modification  AGGRAVATING FACTORS: sleeping on the left side, evenings > mornings  ALLEVIATING FACTORS: rubbing with cream, resting of some benefit, position modifications  SLEEP:   Waking up at night, painful left side lying, able to return to sleep  MEDICAL SCREENING      PAST MEDICAL HISTORY:  Headaches, brain surgery in 2013 for \"meninges problems\" with no complications  IMAGING/DIAGNOSTIC TESTS: none   PATIENT S GOALS:   1. Sleep on the left side at night time without pain limitations                                                                         Objective:  GAIT: minimal trunk rotation bilaterally  DERMATOMES: No overt sensory deficits in response to light touch to the L2-S2 dermatomes bilaterally   MYOTOMES: No overt weakness to the L2-S2 myotomes bilaterally  REFLEXES: 2+/4+ bilaterally to the Patellar and Achilles reflexes " bilaterally    LUMBAR ACTIVE RANGE OF MOTION:  Flexion (+) for back pain and lower leg pain   Flexion x 10 (+) for back pain and lower leg pain   Extension Central low back pain, no distal symptoms   Extension x 10 Central low back pain, no distal symptoms   Side bend L WNL, no effect   Side bend R WNL, no effect     HIP:     PROM L  PROM R AROM L AROM R MMT L MMT R   Flexion WNL, no effect WNL, no effect     5/5, no effect 5/5, no effect   Extension             Abduction WNL, no effect WNL, no effect     5/5, no effect 5-/5, no effect   Adduction               ER WNL, no effect WNL, no effect           IR Min limitations, no effect  Min limitations, no effect             SPECIAL TESTS AND OTHER TESTS:  SLR:  ( + )L for leg pain and buttocks complaints   ( - )R       ASSESSMENT/PLAN  Patient is a 53 year old female with left side hip and lumbar complaints.    Patient has the following significant findings with corresponding treatment plan.                Diagnosis 1:  Lumbar posterior derangement  Pain -  manual therapy, splint/taping/bracing/orthotics, self management, education, directional preference exercise and home program  Decreased ROM/flexibility - manual therapy and therapeutic exercise  Decreased joint mobility - manual therapy and therapeutic exercise  Decreased strength - therapeutic exercise and therapeutic activities  Impaired balance - neuro re-education and therapeutic activities  Impaired gait - gait training     Therapy Evaluation Codes:   1. History comprised of:                 Personal factors that impact the plan of care:                                  None.                  Comorbidity factors that impact the plan of care are:                                  None.                   Medications impacting care: None.  2. Examination of Body Systems comprised of:                 Body structures and functions that impact the plan of care:                                  Hip and Lumbar  Spine.                 Activity limitations that impact the plan of care are:                                  Standing, Walking, Sleeping and Laying down.  3. Clinical presentation characteristics are:                 Stable/Uncomplicated.  4. Decision-Making                                Low complexity using standardized patient assessment instrument and/or measureable assessment of functional outcome.  Cumulative Therapy Evaluation is: Low complexity.     Previous and current functional limitations:  (See Goal Flow Sheet for this information)    Short term and Long term goals: (See Goal Flow Sheet for this information)      Communication ability:  Patient appears to be able to clearly communicate and understand verbal and written communication and follow directions correctly.  Treatment Explanation - The following has been discussed with the patient:   RX ordered/plan of care  Anticipated outcomes  Possible risks and side effects  This patient would benefit from PT intervention to resume normal activities.   Rehab potential is good.     Frequency:  1 X week, once daily  Duration:  for 6 weeks  Discharge Plan:  Achieve all LTG.  Independent in home treatment program.  Reach maximal therapeutic benefit.     Please refer to the daily flowsheet for treatment today, total treatment time and time spent performing 1:1 timed codes.

## 2019-01-15 ENCOUNTER — HOSPITAL ENCOUNTER (OUTPATIENT)
Dept: MAMMOGRAPHY | Facility: CLINIC | Age: 54
Discharge: HOME | End: 2019-01-15
Attending: INTERNAL MEDICINE | Admitting: INTERNAL MEDICINE
Payer: COMMERCIAL

## 2019-01-15 DIAGNOSIS — R92.8 ABNORMAL MAMMOGRAM: ICD-10-CM

## 2019-01-15 PROCEDURE — 77065 DX MAMMO INCL CAD UNI: CPT | Mod: LT

## 2019-01-18 ENCOUNTER — HOSPITAL ENCOUNTER (OUTPATIENT)
Dept: MAMMOGRAPHY | Facility: CLINIC | Age: 54
Discharge: HOME | End: 2019-01-18
Attending: INTERNAL MEDICINE | Admitting: INTERNAL MEDICINE
Payer: COMMERCIAL

## 2019-01-18 DIAGNOSIS — R92.8 ABNORMAL MAMMOGRAM: ICD-10-CM

## 2019-01-18 PROCEDURE — 88305 TISSUE EXAM BY PATHOLOGIST: CPT | Mod: 26 | Performed by: INTERNAL MEDICINE

## 2019-01-18 PROCEDURE — 25000125 ZZHC RX 250: Performed by: INTERNAL MEDICINE

## 2019-01-18 PROCEDURE — 19081 BX BREAST 1ST LESION STRTCTC: CPT | Mod: LT

## 2019-01-18 PROCEDURE — 88305 TISSUE EXAM BY PATHOLOGIST: CPT | Performed by: INTERNAL MEDICINE

## 2019-01-18 RX ORDER — LIDOCAINE HYDROCHLORIDE 10 MG/ML
5 INJECTION, SOLUTION INFILTRATION; PERINEURAL ONCE
Status: COMPLETED | OUTPATIENT
Start: 2019-01-18 | End: 2019-01-18

## 2019-01-18 RX ORDER — LIDOCAINE HYDROCHLORIDE AND EPINEPHRINE 10; 10 MG/ML; UG/ML
10 INJECTION, SOLUTION INFILTRATION; PERINEURAL ONCE
Status: COMPLETED | OUTPATIENT
Start: 2019-01-18 | End: 2019-01-18

## 2019-01-18 RX ADMIN — LIDOCAINE HYDROCHLORIDE,EPINEPHRINE BITARTRATE 10 ML: 10; .01 INJECTION, SOLUTION INFILTRATION; PERINEURAL at 13:51

## 2019-01-18 RX ADMIN — LIDOCAINE HYDROCHLORIDE 5 ML: 10 INJECTION, SOLUTION EPIDURAL; INFILTRATION; INTRACAUDAL at 13:51

## 2019-01-18 NOTE — PROGRESS NOTES
After Your Biopsy  Bleeding or Bruising: Slight bruising is normal. If you bleed through the bandage, put direct pressure on the site for 20 minutes. If you are still bleeding after 20 minutes, call the provider who ordered the exam. If it is after hours please go to the emergency room for further evaluation.    Bandages: Keep your bandage in place until tomorrow morning. Do not get it wet. On the second day, you may remove the bandage. Keep steri strips in place, they will fall off on their own.     Activity: You may shower the morning after the exam. No heavy activity (lifting, vacuuming) for 24 hours.     Discomfort: You may take tylenol for pain. You may also apply an ice pack over biopsy site for 15-20 minutes several times per day. Wear supportive bra for 24 hours following biopsy.    Infection: Infection is rare. Symptoms may include fever, redness, increasing pain, and/or fluid draining from biopsy site. If you have any of these symptoms, please call the provider who ordered your exam.    Results: Results may take up to 3 business days.     Call the provider who ordered your exam if: you have bleeding more than 20 minutes, you have uncontrolled pain, or you have signs of an infection.    Please call one of our nurses if you have questions or concerns after your biopsy: Jocelyn [phone] Jennifer [phone] or Darcie GARCIA [phone]    Thank you for choosing [facility].

## 2019-01-22 ENCOUNTER — TELEPHONE (OUTPATIENT)
Dept: MAMMOGRAPHY | Facility: CLINIC | Age: 54
End: 2019-01-22

## 2019-01-22 LAB — COPATH REPORT: NORMAL

## 2019-01-22 NOTE — TELEPHONE ENCOUNTER
After review by Breast Center Radiologist, Dr. Tejinder Alvarenga, Ms. Fuentes was called and given her 1/18/2019 Left Breast Biopsy results (Fat Necrosis) and recommended Follow up (Annual Screening).  Biopsy site without issues or concerns.  I encouraged her to perform monthly breast self exams and to contact her doctor with any further breast concerns.

## 2019-01-28 ENCOUNTER — OFFICE VISIT (OUTPATIENT)
Dept: FAMILY MEDICINE | Facility: CLINIC | Age: 54
End: 2019-01-28
Payer: COMMERCIAL

## 2019-01-28 VITALS
HEART RATE: 83 BPM | OXYGEN SATURATION: 98 % | WEIGHT: 204 LBS | BODY MASS INDEX: 38.51 KG/M2 | DIASTOLIC BLOOD PRESSURE: 82 MMHG | SYSTOLIC BLOOD PRESSURE: 127 MMHG | HEIGHT: 61 IN | TEMPERATURE: 98.5 F

## 2019-01-28 DIAGNOSIS — Z13.0 SCREENING FOR DEFICIENCY ANEMIA: ICD-10-CM

## 2019-01-28 DIAGNOSIS — D32.9 MENINGIOMA (H): ICD-10-CM

## 2019-01-28 DIAGNOSIS — Z12.4 SCREENING FOR MALIGNANT NEOPLASM OF CERVIX: ICD-10-CM

## 2019-01-28 DIAGNOSIS — Z86.19 HISTORY OF HELICOBACTER PYLORI INFECTION: ICD-10-CM

## 2019-01-28 DIAGNOSIS — E66.01 MORBID OBESITY (H): ICD-10-CM

## 2019-01-28 DIAGNOSIS — Z13.29 SCREENING FOR THYROID DISORDER: ICD-10-CM

## 2019-01-28 DIAGNOSIS — H61.23 BILATERAL IMPACTED CERUMEN: ICD-10-CM

## 2019-01-28 DIAGNOSIS — Z00.00 ROUTINE HISTORY AND PHYSICAL EXAMINATION OF ADULT: Primary | ICD-10-CM

## 2019-01-28 DIAGNOSIS — Z79.899 MEDICATION MANAGEMENT: ICD-10-CM

## 2019-01-28 DIAGNOSIS — M54.5 ACUTE LOW BACK PAIN, UNSPECIFIED BACK PAIN LATERALITY, WITH SCIATICA PRESENCE UNSPECIFIED: ICD-10-CM

## 2019-01-28 DIAGNOSIS — E78.2 MIXED HYPERLIPIDEMIA: ICD-10-CM

## 2019-01-28 LAB
ERYTHROCYTE [DISTWIDTH] IN BLOOD BY AUTOMATED COUNT: 15 % (ref 10–15)
HCT VFR BLD AUTO: 39.4 % (ref 35–47)
HGB BLD-MCNC: 12.8 G/DL (ref 11.7–15.7)
MCH RBC QN AUTO: 26.6 PG (ref 26.5–33)
MCHC RBC AUTO-ENTMCNC: 32.5 G/DL (ref 31.5–36.5)
MCV RBC AUTO: 82 FL (ref 78–100)
PLATELET # BLD AUTO: 259 10E9/L (ref 150–450)
RBC # BLD AUTO: 4.81 10E12/L (ref 3.8–5.2)
WBC # BLD AUTO: 6.3 10E9/L (ref 4–11)

## 2019-01-28 PROCEDURE — G0145 SCR C/V CYTO,THINLAYER,RESCR: HCPCS | Performed by: INTERNAL MEDICINE

## 2019-01-28 PROCEDURE — 99213 OFFICE O/P EST LOW 20 MIN: CPT | Mod: 25 | Performed by: INTERNAL MEDICINE

## 2019-01-28 PROCEDURE — G0476 HPV COMBO ASSAY CA SCREEN: HCPCS | Performed by: INTERNAL MEDICINE

## 2019-01-28 PROCEDURE — 85027 COMPLETE CBC AUTOMATED: CPT | Performed by: INTERNAL MEDICINE

## 2019-01-28 PROCEDURE — 36415 COLL VENOUS BLD VENIPUNCTURE: CPT | Performed by: INTERNAL MEDICINE

## 2019-01-28 PROCEDURE — 99396 PREV VISIT EST AGE 40-64: CPT | Mod: 25 | Performed by: INTERNAL MEDICINE

## 2019-01-28 PROCEDURE — 84443 ASSAY THYROID STIM HORMONE: CPT | Performed by: INTERNAL MEDICINE

## 2019-01-28 PROCEDURE — 69209 REMOVE IMPACTED EAR WAX UNI: CPT | Mod: 50 | Performed by: INTERNAL MEDICINE

## 2019-01-28 PROCEDURE — 80053 COMPREHEN METABOLIC PANEL: CPT | Performed by: INTERNAL MEDICINE

## 2019-01-28 PROCEDURE — 80061 LIPID PANEL: CPT | Performed by: INTERNAL MEDICINE

## 2019-01-28 ASSESSMENT — MIFFLIN-ST. JEOR: SCORE: 1477.72

## 2019-01-28 NOTE — NURSING NOTE
Bilat ear lavage with plain water water and elephant ear.  Minimal wax from left ear and right ear cleared , just a small wax skin pushed to side.      Water remained in ear. Told pt water  should clear in roughly 5 - 10 minutes     Flaca DONOVAN MA

## 2019-01-28 NOTE — PATIENT INSTRUCTIONS
Preventive Health Recommendations  Female Ages 50 - 64    Yearly exam: See your health care provider every year in order to  o Review health changes.   o Discuss preventive care.    o Review your medicines if your doctor has prescribed any.      Get a Pap test every three years (unless you have an abnormal result and your provider advises testing more often).    If you get Pap tests with HPV test, you only need to test every 5 years, unless you have an abnormal result.     You do not need a Pap test if your uterus was removed (hysterectomy) and you have not had cancer.    You should be tested each year for STDs (sexually transmitted diseases) if you're at risk.     Have a mammogram every 1 to 2 years.    Have a colonoscopy at age 50, or have a yearly FIT test (stool test). These exams screen for colon cancer.      Have a cholesterol test every 5 years, or more often if advised.    Have a diabetes test (fasting glucose) every three years. If you are at risk for diabetes, you should have this test more often.     If you are at risk for osteoporosis (brittle bone disease), think about having a bone density scan (DEXA).    Shots: Get a flu shot each year. Get a tetanus shot every 10 years.    Nutrition:     Eat at least 5 servings of fruits and vegetables each day.    Eat whole-grain bread, whole-wheat pasta and brown rice instead of white grains and rice.    Get adequate Calcium and Vitamin D.     Lifestyle    Exercise at least 150 minutes a week (30 minutes a day, 5 days a week). This will help you control your weight and prevent disease.    Limit alcohol to one drink per day.    No smoking.     Wear sunscreen to prevent skin cancer.     See your dentist every six months for an exam and cleaning.    See your eye doctor every 1 to 2 years.    Labs today  Pap smear and ear wash today  There is a new Shingles vaccine called SHINGRIX  It's is a series of two shots, 2-6 months apart  It is considered more than 90%  effective  Please go to our pharmacy located in [address] to get the vaccine  Follow up in one year for physical  Seek sooner medical attention if there is any worsening of symptoms or problems

## 2019-01-28 NOTE — LETTER
[place] [address]  [address]  [place]  [address]  Tel: [phone]    January 31, 2019    Lindsey Fuentes  [address]  [place] [address]        Dear Ms. Fuentes,    This is to inform you regarding your test result.    Your total cholesterol is elevated.  The triglycerides are high. Lowering  the amount of sugar ,alcohol and sweets in the diet helps to control this.Exercise and weight loss helps.  HDL which is called good cholesterol is low.  Your LDL which is called bad cholesterol is elevated.  Eat low cholesterol low fat  diet and do regular physical activity.  If you desire we can refer you to dietician.  Please let us know so we can do referral for you to see the dietician.  CBC result which includes white count Hemoglobin and  Platelet Counts is normal.   The testing of your blood sugar, kidney function, liver function and electrolytes was normal.  TSH which is thyroid hormone is normal.  If you have any further questions or problems, please contact our office.      Sincerely,    Maria Guadalupe Serrano MD/[place]    Enclosure: Lab Results  Results for orders placed or performed in visit on 01/28/19   TSH with free T4 reflex   Result Value Ref Range    TSH 1.22 0.40 - 4.00 mU/L   Lipid panel reflex to direct LDL Fasting   Result Value Ref Range    Cholesterol 218 (H) <200 mg/dL    Triglycerides 214 (H) <150 mg/dL    HDL Cholesterol 46 (L) >49 mg/dL    LDL Cholesterol Calculated 129 (H) <100 mg/dL    Non HDL Cholesterol 172 (H) <130 mg/dL   CBC with platelets   Result Value Ref Range    WBC 6.3 4.0 - 11.0 10e9/L    RBC Count 4.81 3.8 - 5.2 10e12/L    Hemoglobin 12.8 11.7 - 15.7 g/dL    Hematocrit 39.4 35.0 - 47.0 %    MCV 82 78 - 100 fl    MCH 26.6 26.5 - 33.0 pg    MCHC 32.5 31.5 - 36.5 g/dL    RDW 15.0 10.0 - 15.0 %    Platelet Count 259 150 - 450 10e9/L   Comprehensive metabolic panel   Result Value Ref Range    Sodium 136 133 - 144 mmol/L    Potassium 4.2 3.4 - 5.3 mmol/L     Chloride 105 94 - 109 mmol/L    Carbon Dioxide 25 20 - 32 mmol/L    Anion Gap 6 3 - 14 mmol/L    Glucose 86 70 - 99 mg/dL    Urea Nitrogen 9 7 - 30 mg/dL    Creatinine 0.57 0.52 - 1.04 mg/dL    GFR Estimate >90 >60 mL/min/[1.73_m2]    GFR Estimate If Black >90 >60 mL/min/[1.73_m2]    Calcium 9.4 8.5 - 10.1 mg/dL    Bilirubin Total 0.2 0.2 - 1.3 mg/dL    Albumin 3.8 3.4 - 5.0 g/dL    Protein Total 8.0 6.8 - 8.8 g/dL    Alkaline Phosphatase 93 40 - 150 U/L    ALT 28 0 - 50 U/L    AST 27 0 - 45 U/L   Pap imaged thin layer screen with HPV - recommended age 30 - 65 years (select HPV order below)   Result Value Ref Range    PAP NIL     Copath Report         Patient Name: LUCY PERRIN  MR#: [id number]  Specimen #: [id number]  Collected: 1/28/2019  Received: 1/30/2019  Reported: 1/31/2019 14:21  Ordering Phy(s): RAH PIERRE SOOMAR    For improved result formatting, select 'View Enhanced Report Format' under   Linked Documents section.    SPECIMEN/STAIN PROCESS:  Pap imaged thin layer prep screening (Surepath, FocalPoint with guided   screening)       Pap-Cyto x 1, HPV ordered x 1    SOURCE: Cervical, endocervical  ----------------------------------------------------------------   Pap imaged thin layer prep screening (Surepath, FocalPoint with guided   screening)  SPECIMEN ADEQUACY:  Satisfactory for evaluation.  -Transformation zone component present.    CYTOLOGIC INTERPRETATION:    Negative for intraepithelial lesion or malignancy    Electronically signed out by:  AMANDA Fry (ASCP)    Processed and screened at [facility],   [facility]ly [facility], [facility]    CLINICAL HISTORY:  LMP: 7/1/18  Post Menopausal,  A previous normal pap  Date of Last Pap: 5/5/16,    Papanicolaou Test Limitations:  Cervical cytology is a screening test with   limited sensitivity; regular  screening is critical for cancer prevention; Pap tests are primarily   effective for the diagnosis/prevention of  squamous cell  carcinoma, not adenocarcinomas or other cancers.    TESTING LAB LOCATION:  [address]  [address] [phone]    COLLECTION SITE:  Client:  [facility]  Location: [facility] (S)

## 2019-01-28 NOTE — LETTER
February 6, 2019    Lindsey Fuentes  [address]  [place] [address]    Dear ,  This letter is regarding your recent Pap smear (cervical cancer screening) and Human Papillomavirus (HPV) test.  We are happy to inform you that your Pap smear result is normal. Cervical cancer is closely linked with certain types of HPV. Your results showed no evidence of high-risk HPV.  Therefore we recommend you return in 3 years for your next pap smear.  You will still need to return to the clinic every year for an annual exam and other preventive tests.  If you have additional questions regarding this result, please call our registered nurse, Henna at [phone].  Sincerely,    Maria Guadalupe Serrano MD/[facility]

## 2019-01-28 NOTE — PROGRESS NOTES
SUBJECTIVE:   CC: Lindsey Fuentes is an 53 year old woman who presents for preventive health visit.     Healthy Habits:    Do you get at least three servings of calcium containing foods daily (dairy, green leafy vegetables, etc.)? yes    Amount of exercise or daily activities, outside of work: 3 day(s) per week    Problems taking medications regularly not applicable    Medication side effects: No    Have you had an eye exam in the past two years? yes    Do you see a dentist twice per year? yes    Do you have sleep apnea, excessive snoring or daytime drowsiness?no    Patient had H. Pylori while in [facility]  She was treated    Left breast had Biopsy.  Feels lump in area.     Today's PHQ-2 Score:   PHQ-2 ( 1999 Pfizer) 1/28/2019 1/2/2018   Q1: Little interest or pleasure in doing things 0 0   Q2: Feeling down, depressed or hopeless 0 0   PHQ-2 Score 0 0       Abuse: Current or Past(Physical, Sexual or Emotional)- No  Do you feel safe in your environment? Yes    Social History     Tobacco Use     Smoking status: Never Smoker     Smokeless tobacco: Never Used   Substance Use Topics     Alcohol use: No     If you drink alcohol do you typically have >3 drinks per day or >7 drinks per week? No                     Reviewed orders with patient.  Reviewed health maintenance and updated orders accordingly - Yes  Labs reviewed in EPIC  Patient Active Problem List   Diagnosis     Meningioma (H) 10-14 s/po Lt frontotemp craniotomy      Elevated blood pressure     GERD (gastroesophageal reflux disease)     Microscopic hematuria     Positive QuantiFERON-TB Gold test     Class 2 obesity due to excess calories with serious comorbidity and body mass index (BMI) of 38.0 to 38.9 in adult     Mixed hyperlipidemia     Episodic tension-type headache, not intractable Rt temporal to Lt side since 2014 tumor      Abnormal CXR with multiple calcified nodules RUL  12-17     Obesity (BMI 35.0-39.9) with comorbidity (H)     Past Surgical History:    Procedure Laterality Date     COLONOSCOPY N/A 1/15/2018    Procedure: COLONOSCOPY;  colonoscopy;  Surgeon: Kaveh Raymundo MD;  Location:  GI     HEAD & NECK SURGERY      removal of meningioma       Social History     Tobacco Use     Smoking status: Never Smoker     Smokeless tobacco: Never Used   Substance Use Topics     Alcohol use: No     Family History   Problem Relation Age of Onset     Cerebrovascular Disease Mother      Hypertension Mother      Cerebrovascular Disease Father      No Known Problems Brother      No Known Problems Sister      No Known Problems Brother      No Known Problems Brother      No Known Problems Brother      No Known Problems Sister      No Known Problems Sister      No Known Problems Sister      No Known Problems Maternal Grandmother      No Known Problems Maternal Grandfather      No Known Problems Paternal Grandmother      No Known Problems Paternal Grandfather      No Known Problems Son      No Known Problems Daughter      No Known Problems Maternal Half-Brother      No Known Problems Maternal Half-Sister      No Known Problems Paternal Half-Brother      No Known Problems Paternal Half-Sister      No Known Problems Niece      No Known Problems Nephew      No Known Problems Cousin      No Known Problems Other      Breast Cancer No family hx of      Cancer - colorectal No family hx of      Diabetes No family hx of      Coronary Artery Disease No family hx of      Hyperlipidemia No family hx of      Colon Cancer No family hx of      Prostate Cancer No family hx of      Other Cancer No family hx of      Depression No family hx of      Anxiety Disorder No family hx of      Mental Illness No family hx of      Substance Abuse No family hx of      Anesthesia Reaction No family hx of      Asthma No family hx of      Osteoporosis No family hx of      Genetic Disorder No family hx of      Thyroid Disease No family hx of      Obesity No family hx of      Unknown/Adopted No family hx  of          Current Outpatient Medications   Medication Sig Dispense Refill     Acetaminophen (TYLENOL PO) Take 500 mg by mouth every 4 hours as needed for mild pain or fever       Calcium Carbonate-Simethicone 500-125 MG CHEW Take 1 tablet by mouth 3 times daily as needed 120 tablet 0     Allergies   Allergen Reactions     No Known Allergies        Mammogram Screening: Patient over age 50, mutual decision to screen reflected in health maintenance.    Pertinent mammograms are reviewed under the imaging tab.  History of abnormal Pap smear: NO - age 53 PAP every 3 years recommended  PAP / HPV 5/5/2016   PAP NIL     Reviewed and updated as needed this visit by clinical staff  Tobacco  Allergies  Meds  Soc Hx        Reviewed and updated as needed this visit by Provider        ROS:  CONSTITUTIONAL: NEGATIVE for fever, chills, change in weight  INTEGUMENTARY/SKIN: NEGATIVE for worrisome rashes, moles or lesions  EYES: NEGATIVE for vision changes or irritation  ENT: NEGATIVE for ear, mouth and throat problems  RESP: NEGATIVE for significant cough or SOB  BREAST: POSITIVE for lump in left breast  CV: NEGATIVE for chest pain, palpitations or peripheral edema  GI: NEGATIVE for nausea, abdominal pain, heartburn, or change in bowel habits  : NEGATIVE for unusual urinary or vaginal symptoms. No vaginal bleeding.  MUSCULOSKELETAL: NEGATIVE for significant arthralgias or myalgia  NEURO: NEGATIVE for weakness, dizziness or paresthesias  PSYCHIATRIC: NEGATIVE for changes in mood or affect     This document serves as a record of the services and decisions personally performed and made by Maria Guadalupe Serrano MD. It was created on her behalf by Leigha Bello, a trained medical scribe. The creation of this document is based on the provider's statements to the medical scribe.  Leigha Bello 3:40 PM January 28, 2019    OBJECTIVE:   /82 (BP Location: Right arm, Patient Position: Chair, Cuff Size: Adult Large)   Pulse 83    "Temp 98.5  F (36.9  C) (Tympanic)   Ht 1.549 m (5' 1\")   Wt 92.5 kg (204 lb)   LMP 07/01/2018   SpO2 98%   BMI 38.55 kg/m    EXAM:  GENERAL APPEARANCE: obese, alert and no distress  EYES: Eyes grossly normal to inspection, PERRL and conjunctivae and sclerae normal  HENT: ear canals shows cerumen impaction , nose and mouth without ulcers or lesions, oropharynx clear and oral mucous membranes moist  NECK: no adenopathy, no asymmetry, masses, or scars and thyroid normal to palpation  RESP: lungs clear to auscultation - no rales, rhonchi or wheezes  BREAST: bruising at site of biopsy on left side, slight hematoma formation, right breast normal without masses, tenderness or nipple discharge and no palpable axillary masses or adenopathy  CV: regular rate and rhythm, normal S1 S2, no S3 or S4, no murmur, click or rub,slight peripheral edema and peripheral pulses strong  ABDOMEN: soft, nontender, no hepatosplenomegaly, no masses and bowel sounds normal   (female): normal female external genitalia, normal urethral meatus, vaginal mucosal atrophy noted, normal cervix, adnexae, and uterus without masses or abnormal discharge, pap smear performed in office visit today  MS: no musculoskeletal defects are noted and gait is age appropriate without ataxia  SKIN: no suspicious lesions or rashes  NEURO: Normal strength and tone, sensory exam grossly normal, mentation intact and speech normal  PSYCH: mentation appears normal and affect normal/bright    Diagnostic Test Results:  No results found for this or any previous visit (from the past 24 hour(s)).    Reviewed and discussed mammogram done on 01/18/19  Reviewed and discussed colonoscopy done on 01/15/18  Reviewed and discussed labs done on 09/17/18  Reviewed and discussed MR brain done on 05/10/16  ASSESSMENT/PLAN:   Lindsey was seen today for physical.    Diagnoses and all orders for this visit:    Routine history and physical examination of adult  Patient came in today for " a wellness visit  Up to date on mammogram  Up to date on colonoscopy  Educated patient about Shingrix    Screening for malignant neoplasm of cervix  -     Pap imaged thin layer screen with HPV - recommended age 30 - 65 years (select HPV order below)  -     HPV High Risk Types DNA Cervical  Pap smear done in office visit today    Meningioma (H)  Patient had history of meningioma  Had surgery in 2013  MRI in 2016 was normal  Denies any current symptoms    Screening for thyroid disorder  -     TSH with free T4 reflex    Mixed hyperlipidemia  -     Lipid panel reflex to direct LDL Fasting    Screening for deficiency anemia  -     CBC with platelets    Medication management  -     Comprehensive metabolic panel    Morbid obesity (H)  Advised healthy eating and exercise habits    History of Helicobacter pylori infection  -     H Pylori antigen, stool; Future  Reports she had H. Pylori during her stay in [facility]  She was treated for it  Would like to be re-checked  Ordered stool study    Acute low back pain, unspecified back pain laterality, with sciatica presence unspecified  -     [facility]; Future  Patient goes to PT for back pain  It's working well for her  Needs a referral renewal  Renewed her referral    Bilateral impacted cerumen  Ear wash performed in office visit by nursing staff        Patient Instructions   Labs today  Pap smear and ear wash today  There is a new Shingles vaccine called SHINGRIX  It's is a series of two shots, 2-6 months apart  It is considered more than 90% effective  Please go to our pharmacy located in [address] to get the vaccine  Follow up in one year for physical  Seek sooner medical attention if there is any worsening of symptoms or problems    COUNSELING:   Reviewed preventive health counseling, as reflected in patient instructions       Regular exercise       Healthy diet/nutrition    BP Readings from Last 1 Encounters:   01/28/19 127/82     Estimated body  "mass index is 38.55 kg/m  as calculated from the following:    Height as of this encounter: 1.549 m (5' 1\").    Weight as of this encounter: 92.5 kg (204 lb).    Weight management plan: Discussed healthy diet and exercise guidelines     reports that  has never smoked. she has never used smokeless tobacco.    Counseling Resources:  ATP IV Guidelines  Pooled Cohorts Equation Calculator  Breast Cancer Risk Calculator  FRAX Risk Assessment  ICSI Preventive Guidelines  Dietary Guidelines for Americans, 2010  USDA's MyPlate  ASA Prophylaxis  Lung CA Screening    Maria Guadalupe Serrano MD  [facility]  "

## 2019-01-29 LAB
ALBUMIN SERPL-MCNC: 3.8 G/DL (ref 3.4–5)
ALP SERPL-CCNC: 93 U/L (ref 40–150)
ALT SERPL W P-5'-P-CCNC: 28 U/L (ref 0–50)
ANION GAP SERPL CALCULATED.3IONS-SCNC: 6 MMOL/L (ref 3–14)
AST SERPL W P-5'-P-CCNC: 27 U/L (ref 0–45)
BILIRUB SERPL-MCNC: 0.2 MG/DL (ref 0.2–1.3)
BUN SERPL-MCNC: 9 MG/DL (ref 7–30)
CALCIUM SERPL-MCNC: 9.4 MG/DL (ref 8.5–10.1)
CHLORIDE SERPL-SCNC: 105 MMOL/L (ref 94–109)
CHOLEST SERPL-MCNC: 218 MG/DL
CO2 SERPL-SCNC: 25 MMOL/L (ref 20–32)
CREAT SERPL-MCNC: 0.57 MG/DL (ref 0.52–1.04)
GFR SERPL CREATININE-BSD FRML MDRD: >90 ML/MIN/{1.73_M2}
GLUCOSE SERPL-MCNC: 86 MG/DL (ref 70–99)
HDLC SERPL-MCNC: 46 MG/DL
LDLC SERPL CALC-MCNC: 129 MG/DL
NONHDLC SERPL-MCNC: 172 MG/DL
POTASSIUM SERPL-SCNC: 4.2 MMOL/L (ref 3.4–5.3)
PROT SERPL-MCNC: 8 G/DL (ref 6.8–8.8)
SODIUM SERPL-SCNC: 136 MMOL/L (ref 133–144)
TRIGL SERPL-MCNC: 214 MG/DL
TSH SERPL DL<=0.005 MIU/L-ACNC: 1.22 MU/L (ref 0.4–4)

## 2019-01-31 LAB
COPATH REPORT: NORMAL
PAP: NORMAL

## 2019-01-31 NOTE — RESULT ENCOUNTER NOTE
Please notify patient by sending following letter with copy of test results      Yony Lindsey,    This is to inform you regarding your test result.    Your total cholesterol is elevated.  The triglycerides are high. Lowering  the amount of sugar ,alcohol and sweets in the diet helps to control this.Exercise and weight loss helps.  HDL which is called good cholesterol is low.  Your LDL which is called bad cholesterol is elevated.  Eat low cholesterol low fat  diet and do regular physical activity.  If you desire we can refer you to dietician.  Please let us know so we can do referral for you to see the dietician.  CBC result which includes white count Hemoglobin and  Platelet Counts is normal.   The testing of your blood sugar, kidney function, liver function and electrolytes was normal.  TSH which is thyroid hormone is normal.      Sincerely,      Dr.Nasima Zach MD,FACP

## 2019-02-01 ENCOUNTER — THERAPY VISIT (OUTPATIENT)
Dept: PHYSICAL THERAPY | Facility: CLINIC | Age: 54
End: 2019-02-01
Payer: COMMERCIAL

## 2019-02-01 DIAGNOSIS — Z86.19 HISTORY OF HELICOBACTER PYLORI INFECTION: ICD-10-CM

## 2019-02-01 DIAGNOSIS — M54.50 BILATERAL LOW BACK PAIN WITHOUT SCIATICA, UNSPECIFIED CHRONICITY: ICD-10-CM

## 2019-02-01 LAB
FINAL DIAGNOSIS: NORMAL
HPV HR 12 DNA CVX QL NAA+PROBE: NEGATIVE
HPV16 DNA SPEC QL NAA+PROBE: NEGATIVE
HPV18 DNA SPEC QL NAA+PROBE: NEGATIVE
SPECIMEN DESCRIPTION: NORMAL
SPECIMEN SOURCE CVX/VAG CYTO: NORMAL

## 2019-02-01 PROCEDURE — 87338 HPYLORI STOOL AG IA: CPT | Performed by: INTERNAL MEDICINE

## 2019-02-01 PROCEDURE — 97110 THERAPEUTIC EXERCISES: CPT | Mod: GP | Performed by: PHYSICAL THERAPIST

## 2019-02-01 PROCEDURE — 97112 NEUROMUSCULAR REEDUCATION: CPT | Mod: GP | Performed by: PHYSICAL THERAPIST

## 2019-02-01 NOTE — LETTER
[address] [facility]  [address]  [place]  [address]  Tel: [phone]    February 5, 2019    Lindsey Fuentes  [address]  [place] [address]        Dear Ms. Fuentes,    Yony Portillo,    This is to inform you regarding your test result.      H.pylori test is negative    If you have any further questions or problems, please contact our office.      Sincerely,    Maria Guadalupe Serrano MD/ Janina Mcdonald CMA  Results for orders placed or performed in visit on 02/01/19   H Pylori antigen, stool   Result Value Ref Range    Specimen Description Feces     H Pylori Antigen       Negative for Helicobacter pylori antigen by enzyme immunoassay. A negative result   indicates the absence of H. pylori antigen or that the level of antigen is below the level   of detection.                 Enclosure: Lab Results

## 2019-02-04 LAB
H PYLORI AG STL QL IA: NORMAL
SPECIMEN SOURCE: NORMAL

## 2019-02-05 NOTE — RESULT ENCOUNTER NOTE
Please notify patient by sending following letter with copy of test results      Yony Portillo,    This is to inform you regarding your test result.      H.pylori test is negative    Sincerely,      Dr.Nasima Zach MD,FACP

## 2019-07-10 ENCOUNTER — ANCILLARY PROCEDURE (OUTPATIENT)
Dept: GENERAL RADIOLOGY | Facility: CLINIC | Age: 54
End: 2019-07-10
Attending: NURSE PRACTITIONER
Payer: COMMERCIAL

## 2019-07-10 ENCOUNTER — OFFICE VISIT (OUTPATIENT)
Dept: FAMILY MEDICINE | Facility: CLINIC | Age: 54
End: 2019-07-10
Payer: COMMERCIAL

## 2019-07-10 VITALS
SYSTOLIC BLOOD PRESSURE: 128 MMHG | TEMPERATURE: 97.2 F | DIASTOLIC BLOOD PRESSURE: 84 MMHG | OXYGEN SATURATION: 99 % | HEIGHT: 61 IN | WEIGHT: 211.7 LBS | HEART RATE: 72 BPM | BODY MASS INDEX: 39.97 KG/M2

## 2019-07-10 DIAGNOSIS — R10.13 EPIGASTRIC PAIN: Primary | ICD-10-CM

## 2019-07-10 DIAGNOSIS — R10.13 EPIGASTRIC PAIN: ICD-10-CM

## 2019-07-10 PROCEDURE — 93000 ELECTROCARDIOGRAM COMPLETE: CPT | Performed by: NURSE PRACTITIONER

## 2019-07-10 PROCEDURE — 74019 RADEX ABDOMEN 2 VIEWS: CPT

## 2019-07-10 PROCEDURE — 99214 OFFICE O/P EST MOD 30 MIN: CPT | Performed by: NURSE PRACTITIONER

## 2019-07-10 ASSESSMENT — MIFFLIN-ST. JEOR: SCORE: 1512.64

## 2019-07-10 NOTE — PROGRESS NOTES
Subjective     Lindsey Fuentes is a 53 year old female who presents to clinic today for the following health issues:    HPI     Ongoing stomach ache for about 3 days, has started to subside a little bit with the use of antacids  Unilateral headache sometimes    She reports mid abdominal burning with radiation around the abdomen.  Has been taking Nexium for 2 days with improvement.  Denies nausea or vomiting.  Burning worsens after eating.  Had similar burning sensation earlier this early while she was in [facility], and was treated for H. Pylori with negative testing after.  Denies any radiation into jaw, or arm.  No SOB, or diaphoresis with the burning pain.  Denies waking up with sour or bad taste in the mouth.    Did wake up today with right frontal headache and pain behind the right sinus.  Tylenol did help.  Wakes up with stuffy nose.    Past Medical History:   Diagnosis Date     Meningioma (H)      Family History   Problem Relation Age of Onset     Cerebrovascular Disease Mother      Hypertension Mother      Cerebrovascular Disease Father      No Known Problems Brother      No Known Problems Sister      No Known Problems Brother      No Known Problems Brother      No Known Problems Brother      No Known Problems Sister      No Known Problems Sister      No Known Problems Sister      No Known Problems Maternal Grandmother      No Known Problems Maternal Grandfather      No Known Problems Paternal Grandmother      No Known Problems Paternal Grandfather      No Known Problems Son      No Known Problems Daughter      No Known Problems Maternal Half-Brother      No Known Problems Maternal Half-Sister      No Known Problems Paternal Half-Brother      No Known Problems Paternal Half-Sister      No Known Problems Niece      No Known Problems Nephew      No Known Problems Cousin      No Known Problems Other      Breast Cancer No family hx of      Cancer - colorectal No family hx of      Diabetes No family hx of       "Coronary Artery Disease No family hx of      Hyperlipidemia No family hx of      Colon Cancer No family hx of      Prostate Cancer No family hx of      Other Cancer No family hx of      Depression No family hx of      Anxiety Disorder No family hx of      Mental Illness No family hx of      Substance Abuse No family hx of      Anesthesia Reaction No family hx of      Asthma No family hx of      Osteoporosis No family hx of      Genetic Disorder No family hx of      Thyroid Disease No family hx of      Obesity No family hx of      Unknown/Adopted No family hx of      Past Surgical History:   Procedure Laterality Date     COLONOSCOPY N/A 1/15/2018    Procedure: COLONOSCOPY;  colonoscopy;  Surgeon: Kaveh Raymundo MD;  Location:  GI     HEAD & NECK SURGERY      removal of meningioma     Social History     Tobacco Use     Smoking status: Never Smoker     Smokeless tobacco: Never Used   Substance Use Topics     Alcohol use: No     Current Outpatient Medications   Medication Sig Dispense Refill     Acetaminophen (TYLENOL PO) Take 500 mg by mouth every 4 hours as needed for mild pain or fever       Calcium Carbonate-Simethicone 500-125 MG CHEW Take 1 tablet by mouth 3 times daily as needed (Patient not taking: Reported on 7/10/2019) 120 tablet 0     Allergies   Allergen Reactions     No Known Allergies        Reviewed and updated as needed this visit by clinical staff and provider     Review of Systems   Detailed as above    Objective    /84 (BP Location: Right arm, Patient Position: Sitting, Cuff Size: Adult Regular)   Pulse 72   Temp 97.2  F (36.2  C) (Oral)   Ht 1.549 m (5' 1\")   Wt 96 kg (211 lb 11.2 oz)   SpO2 99%   BMI 40.00 kg/m    Body mass index is 40 kg/m .  Physical Exam   Constitutional: She appears well-developed and well-nourished.   HENT:   Head: Normocephalic.   Right Ear: External ear normal.   Left Ear: External ear normal.   Mouth/Throat: Oropharynx is clear and moist.   Neck: Normal " range of motion.   Cardiovascular: Normal rate, regular rhythm, normal heart sounds and intact distal pulses.   Pulmonary/Chest: Effort normal and breath sounds normal.   Abdominal: Bowel sounds are normal. She exhibits no distension. There is no guarding.   Mid-abdominal firm with hypertonicity.   Musculoskeletal: Normal range of motion.   Neurological: She is alert.   Skin: Skin is warm and dry.         Assessment and Plan:       ICD-10-CM    1. Epigastric pain R10.13 EKG 12-lead complete w/read - Clinics     XR Abdomen 2 Views     With her pain being improved with her starting Nexium strong suspicion of her pain being an exacerbation of her GERD. Did complete ECG which was NSR without evidence of acute cardiac process. 2 view abdominal x-ray negative for obstruction, and bowel gas pattern was normal.    She is comfortable continuing on the Nexium at this time. If the pain continues or worsens will refer to Dr Raymundo for EGD      Pt seen in conjunction with Eren Arboleda NP Student     Al Pringle APRN, CNP  [facility]

## 2019-07-10 NOTE — PATIENT INSTRUCTIONS
Continue the nexium daily     Watch your diet: no spicy, acidic foods     If you symptoms are not resolved in 2 weeks then let me know and we can order an endoscopy with Dr Raymundo

## 2019-09-25 NOTE — PROGRESS NOTES
"Subjective     Lindsey Fuentes is a 54 year old female who presents to clinic today for the following health issues:    Patient was late to OV today    HPI   Swollen left forearm      Duration: years    Description (location/character/radiation): swollen    Intensity:  moderate    Accompanying signs and symptoms: none    History (similar episodes/previous evaluation): None    Precipitating or alleviating factors: None    Therapies tried and outcome: None     Patient is here for left forearm lump  Right arm is also swollen  Left is worse than right  It has been like this for a while  Initially had ganglion cyst on left arm  Believes cyst moved  Notes the size seems to be increasing slightly  Would like cyst to be removed  Denies pain    GERD  Symptoms have been flaring  Usually takes zantac for symptoms  Takes for 1 week then stop    Meningioma   S/p craniotomy 2013  Last MRI was done 2016  Occasionally has headaches  This is primarily due to waking up with congested nose  She is also wondering if she is due for meningioma check up    Medications and Labs reviewed in EPIC    Reviewed and updated as needed this visit by Provider         Review of Systems   ROS COMP: Constitutional, HEENT, cardiovascular, pulmonary, GI, , musculoskeletal, neuro, skin, endocrine and psych systems are negative, except as otherwise noted.    POSITIVE for left forearm swelling, lesion on left arm    This document serves as a record of the services and decisions personally performed and made by Maria Guadalupe Serrano MD. It was created on her behalf by Rachna Miranda, a trained medical scribe. The creation of this document is based on the provider's statements to the medical scribe.  Rachna Miranda 10:23 AM September 27, 2019        Objective    /83 (BP Location: Right arm, Patient Position: Chair, Cuff Size: Adult Large)   Pulse 89   Temp 98.6  F (37  C) (Tympanic)   Ht 1.549 m (5' 1\")   Wt 95.7 kg (211 lb)   SpO2 96%   BMI 39.87 kg/m    Body mass " index is 39.87 kg/m .  Physical Exam   GENERAL: obese, alert and no distress  GENERAL APPEARANCE: healthy, alert and no distress  EYES: Eyes grossly normal to inspection, PERRL and conjunctivae and sclerae normal  HENT: Swollen nasal mucosa, ear canals and TM's normal and nose and mouth without ulcers or lesions  NECK: no adenopathy  RESP: lungs clear to auscultation - no rales, rhonchi or wheezes  CV: regular rates and rhythm, normal S1 S2, no S3  MS: palpable lump on both forearms, left more prominent than right, 6-7cm x 3-4cm in size, no edema  Clinically consistent with lipoma so it is localized lump not the whole arm is swollen  PSYCH: mentation appears normal, affect normal/bright    Diagnostic Test Results:  none         Assessment & Plan   Lindsey was seen today for left forearm lump    Diagnoses and all orders for this visit:    Lipoma of left upper extremity  Patient complains of left forearm edema  Also has swelling in right arm  Left arm is worse than right  This has been going on for a while  Also notes she has ganglion cyst on left arm  Thinks this moved and grown slightly in size  Wants this to be removed  Notes no pain  Clinically looks like lipoma  Will order MRI to confirm this  Referral placed  She will schedule  Will look into orthopedic doctor who can do surgery as she wants lipoma removed  Comments:  fore-arm near wrist  Orders:  -     MR Forearm Left w/o & w Contrast; Future  Refer to orthopedics for removal once we get the MRI report    History of meningioma  Patient has history of meningioma  S/p craniotomy 2013  Had MRI done 2016  Notes occasional headaches due to morning congestion  She is wondering when she needs a follow-up  Reviewed neurology note and was recommended repeat MRI every year  Referral for MRI placed  She will schedule  Will refer to neurology if MRI shows any issues  Recommended using saline nasal sprays for congestion  Comments:  left frontal  Orders:  -     MR Brain w/o  "& w Contrast; Future    S/P resection of meningioma  See above  Comments:  2013  Orders:  -     MR Brain w/o & w Contrast; Future    Gastroesophageal reflux disease without esophagitis  Patient's reflux is flaring  Takes zantac for symptoms  Usually only takes for 1 week then stops  Explained some patients need to take antacids daily  Information about controlling reflux provided today  Take 150mg zantac twice daily for 1 month  Avoid nonsteroidal anti-inflammation pain medication such as ibuprofen, motrin, or aleve  Tylenol is safe to use  Monitor diet as this can affect reflux as well  -     ranitidine (ZANTAC) 150 MG tablet; Take 1 tablet (150 mg) by mouth 2 times daily    Flu shot administered in office today    BMI:   Estimated body mass index is 39.87 kg/m  as calculated from the following:    Height as of this encounter: 1.549 m (5' 1\").    Weight as of this encounter: 95.7 kg (211 lb).   Weight management plan: Discussed healthy diet and exercise guidelines    Patient Instructions     Schedule an appointment for MRI  Brain and forearm    Start 150mg zantac twice daily for 1 month  Then take only at bed time  Avoid nonsteroidal anti-inflammation pain medication such as ibuprofen, motrin, or aleve  Tylenol is safe to use  Monitor diet    Use saline nasal spray    Follow-up in 2 months  Seek sooner medical attention if there is any worsening of symptoms or problems    Addendum:  I got information from our referral coordinator that there are orthopedics doctor at [facility] orth[facility] who does surgery for lipoma on the wrist  Dr. Stephanie Silverman I informed the patient  After her MRI gets done she will make the appointment to see 1 of them      The information in this document, created by the medical scribe for me, accurately reflects the services I personally performed and the decisions made by me. I have reviewed and approved this document for accuracy prior to leaving the patient care area.  September " 27, 2019 10:40 AM    Maria Guadalupe Serrano MD  [facility]

## 2019-09-27 ENCOUNTER — OFFICE VISIT (OUTPATIENT)
Dept: FAMILY MEDICINE | Facility: CLINIC | Age: 54
End: 2019-09-27
Payer: COMMERCIAL

## 2019-09-27 VITALS
OXYGEN SATURATION: 96 % | TEMPERATURE: 98.6 F | DIASTOLIC BLOOD PRESSURE: 83 MMHG | HEART RATE: 89 BPM | WEIGHT: 211 LBS | SYSTOLIC BLOOD PRESSURE: 125 MMHG | HEIGHT: 61 IN | BODY MASS INDEX: 39.84 KG/M2

## 2019-09-27 DIAGNOSIS — K21.9 GASTROESOPHAGEAL REFLUX DISEASE WITHOUT ESOPHAGITIS: ICD-10-CM

## 2019-09-27 DIAGNOSIS — Z23 NEED FOR PROPHYLACTIC VACCINATION AND INOCULATION AGAINST INFLUENZA: ICD-10-CM

## 2019-09-27 DIAGNOSIS — Z86.018 HISTORY OF MENINGIOMA: ICD-10-CM

## 2019-09-27 DIAGNOSIS — D17.22 LIPOMA OF LEFT UPPER EXTREMITY: Primary | ICD-10-CM

## 2019-09-27 DIAGNOSIS — Z86.018 S/P RESECTION OF MENINGIOMA: ICD-10-CM

## 2019-09-27 DIAGNOSIS — Z98.890 S/P RESECTION OF MENINGIOMA: ICD-10-CM

## 2019-09-27 PROCEDURE — 99214 OFFICE O/P EST MOD 30 MIN: CPT | Mod: 25 | Performed by: INTERNAL MEDICINE

## 2019-09-27 PROCEDURE — 90471 IMMUNIZATION ADMIN: CPT | Performed by: INTERNAL MEDICINE

## 2019-09-27 PROCEDURE — 90686 IIV4 VACC NO PRSV 0.5 ML IM: CPT | Performed by: INTERNAL MEDICINE

## 2019-09-27 ASSESSMENT — MIFFLIN-ST. JEOR: SCORE: 1504.47

## 2019-09-27 NOTE — PATIENT INSTRUCTIONS
Schedule an appointment for MRI  Brain and forearm    Start 150mg zantac twice daily for 1 month  Then take only at bed time  Avoid nonsteroidal anti-inflammation pain medication such as ibuprofen, motrin, or aleve  Tylenol is safe to use  Monitor diet    Use saline nasal spray    Follow-up in 2 months  Seek sooner medical attention if there is any worsening of symptoms or problems    Patient Education     Tips to Control Acid Reflux    To control acid reflux, you ll need to make some basic diet and lifestyle changes. The simple steps outlined below may be all you ll need to ease discomfort.  Watch what you eat    Avoid fatty foods and spicy foods.    Eat fewer acidic foods, such as citrus and tomato-based foods. These can increase symptoms.    Limit drinking alcohol, caffeine, and fizzy beverages. All increase acid reflux.    Try limiting chocolate, peppermint, and spearmint. These can worsen acid reflux in some people.  Watch when you eat    Avoid lying down for 3 hours after eating.    Do not snack before going to bed.  Raise your head  Raising your head and upper body by 4 to 6 inches helps limit reflux when you re lying down. Put blocks under the head of your bed frame to raise it.  Other changes    Lose weight, if you need to    Don t exercise near bedtime    Avoid tight-fitting clothes    Limit aspirin and ibuprofen    Stop smoking   Date Last Reviewed: 7/1/2016 2000-2018 [employer]. [address] [place] [address], [place] [address]. All rights reserved. This information is not intended as a substitute for professional medical care. Always follow your healthcare professional's instructions.

## 2019-10-08 ENCOUNTER — ANCILLARY PROCEDURE (OUTPATIENT)
Dept: MRI IMAGING | Facility: CLINIC | Age: 54
End: 2019-10-08
Attending: INTERNAL MEDICINE
Payer: COMMERCIAL

## 2019-10-08 DIAGNOSIS — Z86.018 S/P RESECTION OF MENINGIOMA: ICD-10-CM

## 2019-10-08 DIAGNOSIS — Z98.890 S/P RESECTION OF MENINGIOMA: ICD-10-CM

## 2019-10-08 DIAGNOSIS — Z86.018 HISTORY OF MENINGIOMA: ICD-10-CM

## 2019-10-08 DIAGNOSIS — D17.22 LIPOMA OF LEFT UPPER EXTREMITY: ICD-10-CM

## 2019-10-08 PROCEDURE — A9585 GADOBUTROL INJECTION: HCPCS | Performed by: INTERNAL MEDICINE

## 2019-10-08 PROCEDURE — 70553 MRI BRAIN STEM W/O & W/DYE: CPT | Performed by: RADIOLOGY

## 2019-10-08 PROCEDURE — 73218 MRI UPPER EXTREMITY W/O DYE: CPT | Mod: LT | Performed by: RADIOLOGY

## 2019-10-08 RX ORDER — GADOBUTROL 604.72 MG/ML
10 INJECTION INTRAVENOUS ONCE
Status: COMPLETED | OUTPATIENT
Start: 2019-10-08 | End: 2019-10-08

## 2019-10-08 RX ORDER — GADOBUTROL 604.72 MG/ML
0.1 INJECTION INTRAVENOUS ONCE
Status: DISCONTINUED | OUTPATIENT
Start: 2019-10-08 | End: 2019-10-08 | Stop reason: CLARIF

## 2019-10-08 RX ADMIN — GADOBUTROL 10 ML: 604.72 INJECTION INTRAVENOUS at 12:32

## 2019-10-08 NOTE — LETTER
[place] [address] [facility]  [address]  [place]  [address]  Tel: [phone]    October 9, 2019    Lindsey Fuentes  [address] [address]        Dear Ms. Fuentes,    This is to inform you regarding your test result.    I spoke to you on phone but sending you a result note also.  Your MRI shows that you have  normal-appearing subcutaneous fat   At this point we recommend healthy diet and exercise and work on losing weight that would be beneficial.  It does not look like lipoma  You can i still make the appointment to see hand specialist if you desire as it is on your forearm and causing discomfort  Please call the [facility] orth[facility] and ask for  Dr. Brown or Dr. Silverman or   [facility] [place] [phone]     Your brain MRI result is satisfactory.   It shows resection of meningioma.   No evidence of meningioma recurrence   So it is a great news     Sincerely,      Dr.Nasima Zach MD,FACP/SML

## 2019-10-09 NOTE — RESULT ENCOUNTER NOTE
Please notify patient by sending following letter with copy of test results      Yony Portillo,    This is to inform you regarding your test result.    Your brain MRI result is satisfactory.  It shows resection of meningioma.  No evidence of meningioma recurrence  So it is a great news    Sincerely,      Dr.Nasima Zach MD,FACP

## 2019-10-09 NOTE — RESULT ENCOUNTER NOTE
Please notify patient by sending following letter with copy of test results      Yony Portillo,    This is to inform you regarding your test result.    I spoke to you on phone but sending you a result note also.  Your MRI shows that you have  normal-appearing subcutaneous fat   At this point we recommend healthy diet and exercise and work on losing weight that would be beneficial.  It does not look like lipoma  You can i still make the appointment to see hand specialist if you desire as it is on your forearm and causing discomfort  Please call the [facility] orthopedics and ask for  Dr. Brown or Dr. Silverman or   [facility] [phone]     Sincerely,      Dr.Nasima Zach MD,FACP

## 2020-06-25 ENCOUNTER — TELEPHONE (OUTPATIENT)
Dept: FAMILY MEDICINE | Facility: CLINIC | Age: 55
End: 2020-06-25

## 2020-06-25 ENCOUNTER — VIRTUAL VISIT (OUTPATIENT)
Dept: FAMILY MEDICINE | Facility: CLINIC | Age: 55
End: 2020-06-25
Payer: COMMERCIAL

## 2020-06-25 DIAGNOSIS — E66.01 MORBID OBESITY (H): ICD-10-CM

## 2020-06-25 DIAGNOSIS — D32.9 MENINGIOMA (H): ICD-10-CM

## 2020-06-25 DIAGNOSIS — M54.10 RADICULAR LOW BACK PAIN: Primary | ICD-10-CM

## 2020-06-25 PROCEDURE — 99213 OFFICE O/P EST LOW 20 MIN: CPT | Mod: 95 | Performed by: INTERNAL MEDICINE

## 2020-06-25 RX ORDER — TIZANIDINE 2 MG/1
2 TABLET ORAL 3 TIMES DAILY PRN
Qty: 30 TABLET | Refills: 1 | Status: SHIPPED | OUTPATIENT
Start: 2020-06-25 | End: 2020-11-24

## 2020-06-25 RX ORDER — NABUMETONE 500 MG/1
500 TABLET, FILM COATED ORAL 2 TIMES DAILY PRN
Qty: 20 TABLET | Refills: 1 | Status: SHIPPED | OUTPATIENT
Start: 2020-06-25 | End: 2020-11-24

## 2020-06-25 NOTE — TELEPHONE ENCOUNTER
She is overdue for shingrix.  Please schedule nurse appointment or have her go to pharmacy for that  Dr.Nasima Zach MD

## 2020-06-25 NOTE — TELEPHONE ENCOUNTER
Reason for Call:  Shingrix Vaccine     Detailed comments:   Pt calling to get 2nd shingrix vaccine.  Not able to book vaccinations due to COVID.  If provider feels pt needs asap, please reach out to MA's for scheduling.      Phone Number Patient can be reached at: Home number on file [phone] (home)    Best Time: Any     Can we leave a detailed message on this number? YES    Call taken on 6/25/2020 at 3:06 PM by Kanika Giang

## 2020-06-25 NOTE — PROGRESS NOTES
"Lindsey Fuentes is a 54 year old female who is being evaluated via a billable video visit.      The patient has been notified of following:     \"This video visit will be conducted via a call between you and your physician/provider. We have found that certain health care needs can be provided without the need for an in-person physical exam.  This service lets us provide the care you need with a video conversation.  If a prescription is necessary we can send it directly to your pharmacy.  If lab work is needed we can place an order for that and you can then stop by our lab to have the test done at a later time.    Video visits are billed at different rates depending on your insurance coverage.  Please reach out to your insurance provider with any questions.    If during the course of the call the physician/provider feels a video visit is not appropriate, you will not be charged for this service.\"    Patient has given verbal consent for Video visit? Yes    How would you like to obtain your AVS? Mail a copy  Patient would like the video invitation sent by: Text to cell phone: [phone]    Will anyone else be joining your video visit? No    Subjective     Lindsey Fuentes is a 54 year old female who presents today via video visit for the following health issues:    [facility]         Video Start Time: 12:45 PM  Patient is having intermittent low back pain and it radiates on her left leg and mostly the pain is on the left side.  Patient has tried physical therapy in the past which did not help her much.  Not taking any pain medication.  Otherwise she is doing fine and has no other concerns.      Patient Active Problem List   Diagnosis     Meningioma (H) 10-14 s/po Lt frontotemp craniotomy      Elevated blood pressure     GERD (gastroesophageal reflux disease)     Microscopic hematuria     Positive QuantiFERON-TB Gold test     Class 2 obesity due to excess calories with serious comorbidity and body mass index (BMI) of 38.0 to " 38.9 in adult     Mixed hyperlipidemia     Episodic tension-type headache, not intractable Rt temporal to Lt side since 2014 tumor      Abnormal CXR with multiple calcified nodules RUL  12-17     Obesity (BMI 35.0-39.9) with comorbidity (H)     History of Helicobacter pylori infection     Bilateral low back pain without sciatica     History of meningioma     S/P resection of meningioma     Past Surgical History:   Procedure Laterality Date     COLONOSCOPY N/A 1/15/2018    Procedure: COLONOSCOPY;  colonoscopy;  Surgeon: Kaveh Raymundo MD;  Location:  GI     HEAD & NECK SURGERY      removal of meningioma       Social History     Tobacco Use     Smoking status: Never Smoker     Smokeless tobacco: Never Used   Substance Use Topics     Alcohol use: No     Family History   Problem Relation Age of Onset     Cerebrovascular Disease Mother      Hypertension Mother      Cerebrovascular Disease Father      No Known Problems Brother      No Known Problems Sister      No Known Problems Brother      No Known Problems Brother      No Known Problems Brother      No Known Problems Sister      No Known Problems Sister      No Known Problems Sister      No Known Problems Maternal Grandmother      No Known Problems Maternal Grandfather      No Known Problems Paternal Grandmother      No Known Problems Paternal Grandfather      No Known Problems Son      No Known Problems Daughter      No Known Problems Maternal Half-Brother      No Known Problems Maternal Half-Sister      No Known Problems Paternal Half-Brother      No Known Problems Paternal Half-Sister      No Known Problems Niece      No Known Problems Nephew      No Known Problems Cousin      No Known Problems Other      Breast Cancer No family hx of      Cancer - colorectal No family hx of      Diabetes No family hx of      Coronary Artery Disease No family hx of      Hyperlipidemia No family hx of      Colon Cancer No family hx of      Prostate Cancer No family hx of       Other Cancer No family hx of      Depression No family hx of      Anxiety Disorder No family hx of      Mental Illness No family hx of      Substance Abuse No family hx of      Anesthesia Reaction No family hx of      Asthma No family hx of      Osteoporosis No family hx of      Genetic Disorder No family hx of      Thyroid Disease No family hx of      Obesity No family hx of      Unknown/Adopted No family hx of          Current Outpatient Medications   Medication Sig Dispense Refill     Acetaminophen (TYLENOL PO) Take 500 mg by mouth every 4 hours as needed for mild pain or fever       Calcium Carbonate-Simethicone 500-125 MG CHEW Take 1 tablet by mouth 3 times daily as needed 120 tablet 0     nabumetone (RELAFEN) 500 MG tablet Take 1 tablet (500 mg) by mouth 2 times daily as needed for moderate pain 20 tablet 1     tiZANidine (ZANAFLEX) 2 MG tablet Take 1 tablet (2 mg) by mouth 3 times daily as needed for muscle spasms 30 tablet 1       Reviewed and updated as needed this visit by Provider         Review of Systems   Constitutional, HEENT, cardiovascular, pulmonary, GI, , musculoskeletal, neuro, skin, endocrine and psych systems are negative, except as otherwise noted.      Objective             Physical Exam     GENERAL: Healthy, alert and no distress  EYES: Eyes grossly normal to inspection.  No discharge or erythema, or obvious scleral/conjunctival abnormalities.  RESP: No audible wheeze, cough, or visible cyanosis.  No visible retractions or increased work of breathing.    SKIN: Visible skin clear. No significant rash, abnormal pigmentation or lesions.  NEURO: Cranial nerves grossly intact.  Mentation and speech appropriate for age.  PSYCH: Mentation appears normal, affect normal/bright, judgement and insight intact, normal speech and appearance well-groomed.              Assessment & Plan     Lindsey was seen today for hip left.    Diagnoses and all orders for this visit:    Radicular low back  "pain  Comments:  left side    Orders:  -     tiZANidine (ZANAFLEX) 2 MG tablet; Take 1 tablet (2 mg) by mouth 3 times daily as needed for muscle spasms  -     Orthopedic & Spine  Referral; Future  -     nabumetone (RELAFEN) 500 MG tablet; Take 1 tablet (500 mg) by mouth 2 times daily as needed for moderate pain  -     XR Lumbar Spine 2/3 Views; Future  No red flag signs. she is able to walk and move around.  Discussed the conservative management.  X-ray ordered and referral to orthopedics done  Follow-up after that    Morbid obesity (H)  Healthy diet and exercise.    Meningioma (H)  She had MRI of the brain done on 10/2019 and it showed   Postsurgical changes of left frontotemporal meningioma  resection. No evidence of meningioma recurrence.       BMI:   Estimated body mass index is 39.87 kg/m  as calculated from the following:    Height as of 9/27/19: 1.549 m (5' 1\").    Weight as of 9/27/19: 95.7 kg (211 lb).   Weight management plan: Discussed healthy diet and exercise guidelines    Disclaimer: This note consists of symbols derived from keyboarding, dictation and/or voice recognition software. As a result, there may be errors in the script that have gone undetected. Please consider this when interpreting information found in this chart.      See Patient Instructions  Patient Instructions   You are due for mammogram.  Please call the following number to make appointment :  [phone]  It is located in [address]  Take nabumetone 500 mg twice a day as needed  with food for back pain .  Take muscle relaxer tizanidine 2 mg three times a day as needed .  Schedule XR of lumbar spine   Make appointment with  for back pain  [phone]  You are overdue for second shingrix  Follow up with me in 6-8 weeks  Seek sooner medical attention if there is any worsening of symptoms or problems.  Your BMI is There is no height or weight on file to calculate BMI.    What is BMI?  Body mass index (BMI) is one way " to tell whether you are at a healthy weight, overweight, or obese. It measures your weight in relation to your height.  A BMI of 18.5 to 24.9 is in the healthy range. A person with a BMI of 25 to 29.9 is considered overweight, and someone with a BMI of 30 or greater is considered obese.  Another way to find out if you are at risk for health problems caused by overweight and obesity is to measure your waist. If you are a woman and your waist is more than 35 inches, or if you are a man and your waist is more than 40 inches, your risk of disease may be higher.  More than two-thirds of American adults are considered overweight or obese. Being overweight or obese increases the risk for further weight gain.  Excess weight may lead to heart disease and diabetes. Creating and following plans for healthy eating and physical activity may help you improve your health.    Methods for maintaining or losing weight.  Weight control is part of healthy lifestyle and includes exercise, emotional health, and healthy eating habits.  Careful eating habits lifelong is the mainstay of weight control.  Though there are significant health benefits from weight loss, long-term weight loss with diet alone may be very difficult to achieve- studies show long-term success with dietary management in less than 10% of people. Attaining a healthy weight may be especially difficult to achieve in those with severe obesity. In some cases, medications, devices and surgical management might be considered.    What can you do?  If you are overweight or obese and are interested in methods for weight loss, you should discuss this with your provider. In addition, we recommend that you review healthy life styles and methods for weight loss available through the National Institutes of Health patient information sites:   Http://win.niddk.nih.gov/publications/index.htm     65.03 kg/(m^2).    What is BMI?  Body mass index (BMI) is one way to tell whether you are at  a healthy weight, overweight, or obese. It measures your weight in relation to your height.  A BMI of 18.5 to 24.9 is in the healthy range. A person with a BMI of 25 to 29.9 is considered overweight, and someone with a BMI of 30 or greater is considered obese.  Another way to find out if you are at risk for health problems caused by overweight and obesity is to measure your waist. If you are a woman and your waist is more than 35 inches, or if you are a man and your waist is more than 40 inches, your risk of disease may be higher.  More than two-thirds of American adults are considered overweight or obese. Being overweight or obese increases the risk for further weight gain.  Excess weight may lead to heart disease and diabetes. Creating and following plans for healthy eating and physical activity may help you improve your health.    Methods for maintaining or losing weight.  Weight control is part of healthy lifestyle and includes exercise, emotional health, and healthy eating habits.  Careful eating habits lifelong is the mainstay of weight control.  Though there are significant health benefits from weight loss, long-term weight loss with diet alone may be very difficult to achieve- studies show long-term success with dietary management in less than 10% of people. Attaining a healthy weight may be especially difficult to achieve in those with severe obesity. In some cases, medications, devices and surgical management might be considered.    What can you do?  If you are overweight or obese and are interested in methods for weight loss, you should discuss this with your provider. In addition, we recommend that you review healthy life styles and methods for weight loss available through the National Institutes of Health patient information sites:   Http://win.niddk.nih.gov/publications/index.htm                    Return in about 2 months (around 8/25/2020).    Maria Guadalupe Serrano MD  [facility]  [facility]      Video-Visit Details    Type of service:  Video Visit    Video End Time:12:55 PM    Originating Location (pt. Location): Home    Distant Location (provider location):  [facility]     Platform used for Video Visit: Doximity    Return in about 2 months (around 8/25/2020).       Maria Guadalupe Serrano MD

## 2020-06-25 NOTE — PATIENT INSTRUCTIONS
You are due for mammogram.  Please call the following number to make appointment :  [phone]  It is located in [address]  Take nabumetone 500 mg twice a day as needed  with food for back pain .  Take muscle relaxer tizanidine 2 mg three times a day as needed .  Schedule XR of lumbar spine   Make appointment with  for back pain  [phone]  You are overdue for second shingrix  Follow up with me in 6-8 weeks  Seek sooner medical attention if there is any worsening of symptoms or problems.  Your BMI is There is no height or weight on file to calculate BMI.    What is BMI?  Body mass index (BMI) is one way to tell whether you are at a healthy weight, overweight, or obese. It measures your weight in relation to your height.  A BMI of 18.5 to 24.9 is in the healthy range. A person with a BMI of 25 to 29.9 is considered overweight, and someone with a BMI of 30 or greater is considered obese.  Another way to find out if you are at risk for health problems caused by overweight and obesity is to measure your waist. If you are a woman and your waist is more than 35 inches, or if you are a man and your waist is more than 40 inches, your risk of disease may be higher.  More than two-thirds of American adults are considered overweight or obese. Being overweight or obese increases the risk for further weight gain.  Excess weight may lead to heart disease and diabetes. Creating and following plans for healthy eating and physical activity may help you improve your health.    Methods for maintaining or losing weight.  Weight control is part of healthy lifestyle and includes exercise, emotional health, and healthy eating habits.  Careful eating habits lifelong is the mainstay of weight control.  Though there are significant health benefits from weight loss, long-term weight loss with diet alone may be very difficult to achieve- studies show long-term success with dietary management in less than 10% of people.  Attaining a healthy weight may be especially difficult to achieve in those with severe obesity. In some cases, medications, devices and surgical management might be considered.    What can you do?  If you are overweight or obese and are interested in methods for weight loss, you should discuss this with your provider. In addition, we recommend that you review healthy life styles and methods for weight loss available through the National Institutes of Health patient information sites:   Http://win.niddk.nih.gov/publications/index.htm     65.03 kg/(m^2).    What is BMI?  Body mass index (BMI) is one way to tell whether you are at a healthy weight, overweight, or obese. It measures your weight in relation to your height.  A BMI of 18.5 to 24.9 is in the healthy range. A person with a BMI of 25 to 29.9 is considered overweight, and someone with a BMI of 30 or greater is considered obese.  Another way to find out if you are at risk for health problems caused by overweight and obesity is to measure your waist. If you are a woman and your waist is more than 35 inches, or if you are a man and your waist is more than 40 inches, your risk of disease may be higher.  More than two-thirds of American adults are considered overweight or obese. Being overweight or obese increases the risk for further weight gain.  Excess weight may lead to heart disease and diabetes. Creating and following plans for healthy eating and physical activity may help you improve your health.    Methods for maintaining or losing weight.  Weight control is part of healthy lifestyle and includes exercise, emotional health, and healthy eating habits.  Careful eating habits lifelong is the mainstay of weight control.  Though there are significant health benefits from weight loss, long-term weight loss with diet alone may be very difficult to achieve- studies show long-term success with dietary management in less than 10% of people. Attaining a healthy weight  may be especially difficult to achieve in those with severe obesity. In some cases, medications, devices and surgical management might be considered.    What can you do?  If you are overweight or obese and are interested in methods for weight loss, you should discuss this with your provider. In addition, we recommend that you review healthy life styles and methods for weight loss available through the National Institutes of Health patient information sites:   Http://win.niddk.nih.gov/publications/index.htm

## 2020-06-26 ENCOUNTER — ANCILLARY PROCEDURE (OUTPATIENT)
Dept: GENERAL RADIOLOGY | Facility: CLINIC | Age: 55
End: 2020-06-26
Attending: INTERNAL MEDICINE
Payer: COMMERCIAL

## 2020-06-26 ENCOUNTER — ANCILLARY PROCEDURE (OUTPATIENT)
Dept: MAMMOGRAPHY | Facility: CLINIC | Age: 55
End: 2020-06-26
Attending: INTERNAL MEDICINE
Payer: COMMERCIAL

## 2020-06-26 DIAGNOSIS — M54.10 RADICULAR LOW BACK PAIN: ICD-10-CM

## 2020-06-26 DIAGNOSIS — Z12.31 VISIT FOR SCREENING MAMMOGRAM: ICD-10-CM

## 2020-06-26 PROCEDURE — 72100 X-RAY EXAM L-S SPINE 2/3 VWS: CPT | Performed by: RADIOLOGY

## 2020-06-26 PROCEDURE — 77067 SCR MAMMO BI INCL CAD: CPT

## 2020-06-26 NOTE — LETTER
June 29, 2020      Lindsey Fuentes  [address] [address]        Yony Portillo,     This is to inform you regarding your test result.     I spoke to you on phone but sending you a result note also.   Your XR did not show anything significant..   Keep your appointment with spine specialist as scheduled .         Resulted Orders   XR Lumbar Spine 2/3 Views    Narrative    3 views lumbar spine radiographs 6/26/2020 12:51 PM    History: radicular low back pain; Radicular low back pain    Comparison: None available    Findings:    Standing  AP, lateral and lateral spot views of the lumbar spine were  obtained.    5  lumbar type vertebral bodies are assumed for the purpose of this  dictation.    There is no acute osseous abnormality.  Punctate calcific densities  within the abdomen, likely within the bowel.    There on no significant degenerative changes of the lumbar spine. The  disc spaces appear preserved. Subtle endplate remodeling at L4-L5 and  L5-S1.     The visualized bowel gas pattern is non-obstructive.      Impression    Impression:  1.  No acute osseous abnormality.  2. No significant osseous abnormalities and degenerative changes, mild  endplate remodeling L4-L5.    JUTTA ELLERMANN, MD       Sincerely,       Dr.Nasima Zach MD,FACP

## 2020-06-28 NOTE — RESULT ENCOUNTER NOTE
Please notify patient by sending following letter with copy of test results      Yony Portillo,    This is to inform you regarding your test result.    I spoke to you on phone but sending you a result note also.  Your XR did not show anything significant..  Keep your appointment with spine specialist as scheduled .      Sincerely,      Dr.Nasima Zach MD,FACP

## 2020-06-30 ENCOUNTER — ALLIED HEALTH/NURSE VISIT (OUTPATIENT)
Dept: NURSING | Facility: CLINIC | Age: 55
End: 2020-06-30
Payer: COMMERCIAL

## 2020-06-30 ENCOUNTER — OFFICE VISIT (OUTPATIENT)
Dept: NEUROSURGERY | Facility: CLINIC | Age: 55
End: 2020-06-30
Attending: PHYSICIAN ASSISTANT
Payer: COMMERCIAL

## 2020-06-30 VITALS
HEART RATE: 82 BPM | SYSTOLIC BLOOD PRESSURE: 126 MMHG | TEMPERATURE: 98 F | OXYGEN SATURATION: 99 % | HEIGHT: 63 IN | DIASTOLIC BLOOD PRESSURE: 86 MMHG | BODY MASS INDEX: 36.32 KG/M2 | WEIGHT: 205 LBS

## 2020-06-30 DIAGNOSIS — M54.10 RADICULAR LOW BACK PAIN: ICD-10-CM

## 2020-06-30 DIAGNOSIS — Z23 NEED FOR VACCINATION: Primary | ICD-10-CM

## 2020-06-30 PROCEDURE — 90750 HZV VACC RECOMBINANT IM: CPT

## 2020-06-30 PROCEDURE — 99207 ZZC NO CHARGE LOS: CPT

## 2020-06-30 PROCEDURE — 90471 IMMUNIZATION ADMIN: CPT

## 2020-06-30 PROCEDURE — G0463 HOSPITAL OUTPT CLINIC VISIT: HCPCS

## 2020-06-30 PROCEDURE — 99203 OFFICE O/P NEW LOW 30 MIN: CPT | Performed by: PHYSICIAN ASSISTANT

## 2020-06-30 RX ORDER — CETIRIZINE HYDROCHLORIDE 10 MG/1
10 TABLET ORAL DAILY
Refills: 0 | COMMUNITY
Start: 2019-08-12 | End: 2022-06-24

## 2020-06-30 ASSESSMENT — PAIN SCALES - GENERAL: PAINLEVEL: MODERATE PAIN (5)

## 2020-06-30 ASSESSMENT — MIFFLIN-ST. JEOR: SCORE: 1509

## 2020-06-30 NOTE — NURSING NOTE
"Lindsey Fuentes is a 54 year old female who presents for:  Chief Complaint   Patient presents with     Neurologic Problem     chronic radicular low back pain.        Initial Vitals:  /86 (BP Location: Left arm, Patient Position: Sitting, Cuff Size: Adult Large)   Pulse 82   Temp 98  F (36.7  C) (Oral)   Ht 5' 3\" (1.6 m)   Wt 205 lb (93 kg)   SpO2 99%   BMI 36.31 kg/m   Estimated body mass index is 36.31 kg/m  as calculated from the following:    Height as of this encounter: 5' 3\" (1.6 m).    Weight as of this encounter: 205 lb (93 kg).. Body surface area is 2.03 meters squared. BP completed using cuff size: large  Moderate Pain (5)    Nursing Comments: see chief complaint    Gadiel Mobley, [facility]    "

## 2020-06-30 NOTE — LETTER
6/30/2020         RE: Lindsey Fuentes  [address] [address]        Dear Colleague,    Thank you for referring your patient, Lindsey Fuentes, to the [facility]. Please see a copy of my visit note below.    Neurosurgery Clinic Consult    HPI    Ms. Fuentes is a 54-year-old female who presents to the neurosurgery clinic for evaluation of left-sided low back pain.  She states the pain is most persistent at night when she is lying in bed and improves when she wakes up and walks around and then is not bothersome to her throughout the day.  She denies any particular pain or numbness or weakness in her left leg or right leg.  She denies any bowel or bladder symptoms or saddle anesthesia.  She states she has had this pain for a few years and it comes and goes intermittently.  She recently had a lumbar x-ray from her primary care provider and is here for further evaluation.  She denies increased pain with coughing.    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    Assessment    Low back pain    Plan:      I reassured the patient that her exam is benign, and does not point to any radicular component to the pain.  The fact that her symptoms are intermittent and only present at night and that they go away with increased activity such as jogging, indicates that  there likely is not an structural abnormality causing her symptoms rather his syndrome of inflammation/tension/muscle strain perhaps could be giving her some of the symptoms at night.  She was pleased to hear that there were no major abnormalities on her x-rays found comfort in his reassurance, and plans to begin exercising with her physical therapy instructions from when she had previously been evaluated by them.  She will contact us in the future if she should have worsening pain or increased radicular symptoms.  No further follow-up is needed at this time    Total time of 30 minutes spent with the patient today greater than 50% spent face to face in counseling and coordination of care.    Again, thank you for allowing me to participate in the care of your patient.        Sincerely,        Jovany Hammond PA-C

## 2020-06-30 NOTE — PROGRESS NOTES
Prior to immunization administration, verified patients identity using patient s name and date of birth. Please see Immunization Activity for additional information.     Screening Questionnaire for Adult Immunization    Are you sick today?   No   Do you have allergies to medications, food, a vaccine component or latex?   No   Have you ever had a serious reaction after receiving a vaccination?   No   Do you have a long-term health problem with heart, lung, kidney, or metabolic disease (e.g., diabetes), asthma, a blood disorder, no spleen, complement component deficiency, a cochlear implant, or a spinal fluid leak?  Are you on long-term aspirin therapy?   No   Do you have cancer, leukemia, HIV/AIDS, or any other immune system problem?   No   Do you have a parent, brother, or sister with an immune system problem?   No   In the past 3 months, have you taken medications that affect  your immune system, such as prednisone, other steroids, or anticancer drugs; drugs for the treatment of rheumatoid arthritis, Crohn s disease, or psoriasis; or have you had radiation treatments?   No   Have you had a seizure, or a brain or other nervous system problem?   No   During the past year, have you received a transfusion of blood or blood    products, or been given immune (gamma) globulin or antiviral drug?   No   For women: Are you pregnant or is there a chance you could become       pregnant during the next month?   No   Have you received any vaccinations in the past 4 weeks?   No     Immunization questionnaire answers were all negative.        Per orders of Dr. Serrano, injection of Shingrix given by Dustin Valverde CMA. Patient instructed to remain in clinic for 15 minutes afterwards, and to report any adverse reaction to me immediately.       Screening performed by Dustin Valverde CMA on 6/30/2020 at 2:25 PM.

## 2020-06-30 NOTE — PROGRESS NOTES
Neurosurgery Clinic Consult    HPI    Ms. Fuentes is a 54-year-old female who presents to the neurosurgery clinic for evaluation of left-sided low back pain.  She states the pain is most persistent at night when she is lying in bed and improves when she wakes up and walks around and then is not bothersome to her throughout the day.  She denies any particular pain or numbness or weakness in her left leg or right leg.  She denies any bowel or bladder symptoms or saddle anesthesia.  She states she has had this pain for a few years and it comes and goes intermittently.  She recently had a lumbar x-ray from her primary care provider and is here for further evaluation.  She denies increased pain with coughing.    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    Assessment    Low back pain    Plan:      I reassured the patient that her exam is benign, and does not point to any radicular component to the pain.  The fact that her symptoms are intermittent and only present at night and that they go away with increased activity such as jogging, indicates that there likely is not an structural abnormality causing her symptoms rather his syndrome of inflammation/tension/muscle strain perhaps could be giving her some of the symptoms at night.  She was pleased to hear that there were no major abnormalities on her  x-rays found comfort in his reassurance, and plans to begin exercising with her physical therapy instructions from when she had previously been evaluated by them.  She will contact us in the future if she should have worsening pain or increased radicular symptoms.  No further follow-up is needed at this time    Total time of 30 minutes spent with the patient today greater than 50% spent face to face in counseling and coordination of care.

## 2020-07-28 ENCOUNTER — VIRTUAL VISIT (OUTPATIENT)
Dept: FAMILY MEDICINE | Facility: OTHER | Age: 55
End: 2020-07-28

## 2020-07-29 NOTE — PROGRESS NOTES
"Date: 2020 21:53:40  Clinician: Masood Fulton  Clinician NPI: [id number]  Patient: Lindsey Fuentes  Patient : 1965  Patient Address: [address] [place] [address]  Patient Phone: [phone]  Visit Protocol: URI  Patient Summary:  Lindsey is a 54 year old ( : 1965 ) female who initiated a Visit for COVID-19 (Coronavirus) evaluation and screening. When asked the question \"Please sign me up to receive news, health information and promotions. \", Lindsey responded \"No\".    Lindsey states her symptoms started suddenly 3-4 days ago.   Her symptoms consist of ageusia, myalgia, a sore throat, facial pain or pressure, a cough, nasal congestion, rhinitis, malaise, a headache, and chills. Lindsey also feels feverish but was unable to measure her temperature.   Symptom details     Nasal secretions: The color of her mucus is clear.    Cough: Lindsey coughs a few times an hour and her cough is more bothersome at night. Phlegm does not come into her throat when she coughs. She does not believe her cough is caused by post-nasal drip.     Sore throat: Lindsey reports having moderate throat pain (4-6 on a 10 point pain scale), does not have exudate on her tonsils, and can swallow liquids. She is not sure if the lymph nodes in her neck are enlarged. A rash has not appeared on the skin since the sore throat started.     Facial pain or pressure: The facial pain or pressure feels worse when bending over or leaning forward.     Headache: She states the headache is moderate (4-6 on a 10 point pain scale).      Lindsey denies having wheezing, nausea, teeth pain, diarrhea, anosmia, vomiting, and ear pain. She also denies having recent facial or sinus surgery in the past 60 days, double sickening (worsening symptoms after initial improvement), taking antibiotic medication in the past month, and having a sinus infection within the past year. She is not experiencing dyspnea.   Precipitating events  Within the " past week, Lindsey has been exposed to someone with strep throat. She has recently been exposed to someone with influenza. Lindsey has been in close contact with the following high risk individuals: children under the age of 5.   Pertinent COVID-19 (Coronavirus) information  In the past 14 days, Lindsey has not worked in a congregate living setting.   She does not work or volunteer as healthcare worker or a  and does not work or volunteer in a healthcare facility.   Lindsey also has not lived in a congregate living setting in the past 14 days. She lives with a healthcare worker.   Lindsey has not had a close contact with a laboratory-confirmed COVID-19 patient within 14 days of symptom onset.   Pertinent medical history  Lindsey does not get yeast infections when she takes antibiotics.   Lnidsey needs a return to work/school note.   Weight: 203 lbs   Lindsey does not smoke or use smokeless tobacco.   Weight: 203 lbs    MEDICATIONS: Tylenol Extra Strength oral, ALLERGIES: NKDA  Clinician Response:  Dear Lindsey,   Your symptoms show that you may have coronavirus (COVID-19). This illness can cause fever, cough and trouble breathing. Many people get a mild case and get better on their own. Some people can get very sick.  What should I do?  We would like to test you for this virus.   1. Please call [phone] to schedule your visit. Explain that you were referred by [facility] to have a COVID-19 test. Be ready to [facility].  The following will serve as your written order for this COVID Test, ordered by me, for the indication of suspected COVID [Z20.828]: The test will be ordered in [employer], our electronic health record, after you are scheduled. It will show as ordered and authorized by Devon Hightower MD.  Order: COVID-19 (Coronavirus) PCR for SYMPTOMATIC testing from [facility].      2. When it's time for your COVID test:  Stay at least 6 feet away from others. (If someone will drive you to  "your test, stay in the backseat, as far away from the  as you can.)   Cover your mouth and nose with a mask, tissue or washcloth.  Go straight to the testing site. Don't make any stops on the way there or back.      3.Starting now: Stay home and away from others (self-isolate) until:   You've had no fever---and no medicine that reduces fever---for 3 full days (72 hours). And...   Your other symptoms have gotten better. For example, your cough or breathing has improved. And...   At least 10 days have passed since your symptoms started.       During this time, don't leave the house except for testing or medical care.   Stay in your own room, even for meals. Use your own bathroom if you can.   Stay away from others in your home. No hugging, kissing or shaking hands. No visitors.  Don't go to work, school or anywhere else.    Clean \"high touch\" surfaces often (doorknobs, counters, handles, etc.). Use a household cleaning spray or wipes. You'll find a full list of  on the EPA website: www.epa.gov/pesticide-registration/list-n-disinfectants-use-against-sars-cov-2.   Cover your mouth and nose with a mask, tissue or washcloth to avoid spreading germs.  Wash your hands and face often. Use soap and water.  Caregivers in these groups are at risk for severe illness due to COVID-19:  o People 65 years and older  o People who live in a nursing home or long-term care facility  o People with chronic disease (lung, heart, cancer, diabetes, kidney, liver, immunologic)  o People who have a weakened immune system, including those who:   Are in cancer treatment  Take medicine that weakens the immune system, such as corticosteroids  Had a bone marrow or organ transplant  Have an immune deficiency  Have poorly controlled HIV or AIDS  Are obese (body mass index of 40 or higher)  Smoke regularly   o Caregivers should wear gloves while washing dishes, handling laundry and cleaning bedrooms and bathrooms.  o Use caution when " washing and drying laundry: Don't shake dirty laundry, and use the warmest water setting that you can.  o For more tips, go to www.cdc.gov/coronavirus/2019-ncov/downloads/10Things.pdf.    4.Sign up for [facility]. We know it's scary to hear that you might have COVID-19. We want to track your symptoms to make sure you're okay over the next 2 weeks. Please look for an email from [facility]---this is a free, online program that we'll use to keep in touch. To sign up, follow the link in the email. Learn more at [email or web address]  How can I take care of myself?   Get lots of rest. Drink extra fluids (unless a doctor has told you not to).   Take Tylenol (acetaminophen) for fever or pain. If you have liver or kidney problems, ask your family doctor if it's okay to take Tylenol.   Adults can take either:    650 mg (two 325 mg pills) every 4 to 6 hours, or...   1,000 mg (two 500 mg pills) every 8 hours as needed.    Note: Don't take more than 3,000 mg in one day. Acetaminophen is found in many medicines (both prescribed and over-the-counter medicines). Read all labels to be sure you don't take too much.   For children, check the Tylenol bottle for the right dose. The dose is based on the child's age or weight.    If you have other health problems (like cancer, heart failure, an organ transplant or severe kidney disease): Call your specialty clinic if you don't feel better in the next 2 days.       Know when to call 911. Emergency warning signs include:    Trouble breathing or shortness of breath Pain or pressure in the chest that doesn't go away Feeling confused like you haven't felt before, or not being able to wake up Bluish-colored lips or face.  Where can I get more information?    [facility] -- About COVID-19: [email or web address]   CDC -- What to Do If You're Sick: www.cdc.gov/coronavirus/2019-ncov/about/steps-when-sick.html   CDC -- Ending Home Isolation:  www.cdc.gov/coronavirus/2019-ncov/hcp/disposition-in-home-patients.html   [facility] -- Caring for Someone: www.cdc.gov/coronavirus/2019-ncov/if-you-are-sick/care-for-someone.html   [facility] -- Interim Guidance for Hospital Discharge to Home: www.[facility].[place]/diseases/coronavirus/hcp/hospdischarge.pdf   [facility] clinical trials (COVID-19 research studies): clinicalaffairs.[facility]-clinical-trials    Below are the COVID-19 hotlines at the [place] [facility] ([facility]). Interpreters are available.    For health questions: Call 666-725-5890 or 1-468.908.9636 (7 a.m. to 7 p.m.) For questions about schools and childcare: Call 828-061-6052 or 1-591.275.5425 (7 a.m. to 7 p.m.)    Diagnosis: Cough  Diagnosis ICD: R05

## 2020-08-01 ENCOUNTER — VIRTUAL VISIT (OUTPATIENT)
Dept: FAMILY MEDICINE | Facility: OTHER | Age: 55
End: 2020-08-01

## 2020-08-01 NOTE — PROGRESS NOTES
"Date: 2020 08:43:06  Clinician: Masood Fulton  Clinician NPI: [id number]  Patient: Lindsey Fuentes  Patient : 1965  Patient Address: [address] [place] [address]  Patient Phone: [phone]  Visit Protocol: IBS  Patient Summary:  Lindsey is a 54 year old ( : 1965 ) female who initiated a Visit for evaluation of IBS. When asked the question \"Please sign me up to receive news, health information and promotions. \", Lindsey responded \"No\".     In the last 3 months, she experienced the following:     Hard or lumpy stools: never     Loose, mushy or watery stools: never     Blood in the stool: never     Black stools: never     Vomited blood: never      The patient denies the following: abdominal pain that interferes with sleep, diarrhea that interferes with sleep, pain with urination, feeling feverish, recent abdominal trauma or injury, increased urination frequency, reflux, unintentional weight loss, severe sharp abdominal pain, and back pain associated with stomach symptoms.   The patient has NOT modified her diet to treat these symptoms.   The patient does not smoke or use smokeless tobacco.   Additional information as reported by the patient (free text): I have diarrhea twice and when I diarrhea it comes with a burning sensation hence painful. the color of stool is light yellow and light brown     MEDICATIONS: Tylenol Extra Strength oral, ALLERGIES: NKDA  Clinician Response:  Dear Lindsey,  Based on the information you provided, you likely have Irritable Bowel Syndrome, a condition that leads to abdominal pain and cramping.  Unless you are allergic to the over-the-counter medication(s) below, I recommend using:     Methylcellulose (Citrucel). Take 1 scoop orally 1-3 times a day mixed with water. This is an over-the-counter medication that does not require a prescription.   Irritable Bowel Syndrome (IBS) is a common condition described as a combination of persistent and recurrent " abdominal pain that is associated with abnormal bowel habits including diarrhea, constipation or both. Many individuals experience pain after eating and the pain is typically relieved after a bowel movement. Other symptoms of irritable bowel syndrome include bloating, gas, and burping or belching. The exact cause of IBS is not known.  The goal of treatment is to reduce your symptoms to a manageable level. Treatment of IBS is typically a long-term process and you may need to try several different treatments or may need to combine treatments to reduce your symptoms.     Monitor your symptoms to try determine if certain things make your symptoms worse, such as consuming dairy products.    Dietary management may include eliminating certain foods from your diet. However, consult with a health care professional before completely eliminating foods from your diet.    Increasing dietary fiber or using fiber supplements can reduce IBS symptoms.    Medications: prescription medications are sometimes recommended. However, these medications are not typically used until a person has tried dietary changes or fiber supplements.    Over the counter medications for treatment of diarrhea should not be continued on an ongoing basis unless you have consulted with a health care professional.     For more information about IBS and treatment, see this National [place] of Health online resource: http://www.nlm.nih.gov/medlineplus/irritablebowelsyndrome.html.   If at any time your symptoms worsen while trying these therapies, follow up with your primary care provider.   If you do not experience any improvement within two to four weeks, you should be seen by your primary care provider.   If you develop worsening abdominal pain with nausea, vomiting, or blood in stool, be seen by your primary care provider, urgent care clinic, or emergency department.   Diagnosis: Irritable bowel syndrome with diarrhea  Diagnosis ICD: K58.0

## 2020-11-24 ENCOUNTER — OFFICE VISIT (OUTPATIENT)
Dept: FAMILY MEDICINE | Facility: CLINIC | Age: 55
End: 2020-11-24
Payer: COMMERCIAL

## 2020-11-24 VITALS
TEMPERATURE: 96.6 F | HEART RATE: 70 BPM | OXYGEN SATURATION: 99 % | WEIGHT: 206.2 LBS | SYSTOLIC BLOOD PRESSURE: 127 MMHG | HEIGHT: 62 IN | DIASTOLIC BLOOD PRESSURE: 85 MMHG | BODY MASS INDEX: 37.94 KG/M2

## 2020-11-24 DIAGNOSIS — Z13.0 SCREENING FOR DEFICIENCY ANEMIA: ICD-10-CM

## 2020-11-24 DIAGNOSIS — Z11.59 SCREENING FOR VIRAL DISEASE: ICD-10-CM

## 2020-11-24 DIAGNOSIS — Z11.59 NEED FOR HEPATITIS C SCREENING TEST: ICD-10-CM

## 2020-11-24 DIAGNOSIS — Z79.899 MEDICATION MANAGEMENT: ICD-10-CM

## 2020-11-24 DIAGNOSIS — E78.2 MIXED HYPERLIPIDEMIA: ICD-10-CM

## 2020-11-24 DIAGNOSIS — D32.9 MENINGIOMA (H): ICD-10-CM

## 2020-11-24 DIAGNOSIS — J01.00 ACUTE NON-RECURRENT MAXILLARY SINUSITIS: ICD-10-CM

## 2020-11-24 DIAGNOSIS — M54.10 RADICULAR LOW BACK PAIN: ICD-10-CM

## 2020-11-24 DIAGNOSIS — H61.21 IMPACTED CERUMEN OF RIGHT EAR: ICD-10-CM

## 2020-11-24 DIAGNOSIS — Z13.29 SCREENING FOR THYROID DISORDER: ICD-10-CM

## 2020-11-24 DIAGNOSIS — E66.01 MORBID OBESITY (H): ICD-10-CM

## 2020-11-24 DIAGNOSIS — R51.9 SINUS HEADACHE: ICD-10-CM

## 2020-11-24 DIAGNOSIS — Z23 NEED FOR PROPHYLACTIC VACCINATION AND INOCULATION AGAINST INFLUENZA: ICD-10-CM

## 2020-11-24 DIAGNOSIS — Z00.00 ROUTINE GENERAL MEDICAL EXAMINATION AT A HEALTH CARE FACILITY: Primary | ICD-10-CM

## 2020-11-24 PROCEDURE — 99213 OFFICE O/P EST LOW 20 MIN: CPT | Mod: 25 | Performed by: INTERNAL MEDICINE

## 2020-11-24 PROCEDURE — 99396 PREV VISIT EST AGE 40-64: CPT | Mod: 25 | Performed by: INTERNAL MEDICINE

## 2020-11-24 PROCEDURE — 90682 RIV4 VACC RECOMBINANT DNA IM: CPT | Performed by: INTERNAL MEDICINE

## 2020-11-24 PROCEDURE — 90471 IMMUNIZATION ADMIN: CPT | Performed by: INTERNAL MEDICINE

## 2020-11-24 RX ORDER — BUTALBITAL, ACETAMINOPHEN AND CAFFEINE 50; 325; 40 MG/1; MG/1; MG/1
1 TABLET ORAL 2 TIMES DAILY PRN
Qty: 10 TABLET | Refills: 1 | Status: SHIPPED | OUTPATIENT
Start: 2020-11-24 | End: 2024-10-02

## 2020-11-24 ASSESSMENT — MIFFLIN-ST. JEOR: SCORE: 1493.7

## 2020-11-24 NOTE — PROGRESS NOTES
SUBJECTIVE:   CC: Lindsey Fuentes is an 55 year old woman who presents for preventive health visit.       Patient has been advised of split billing requirements and indicates understanding: Yes  Healthy Habits:     Getting at least 3 servings of Calcium per day:  NO    Bi-annual eye exam:  Yes    Dental care twice a year:  Yes    Sleep apnea or symptoms of sleep apnea:  None    Diet:  Regular (no restrictions)    Frequency of exercise:  2-3 days/week    Duration of exercise:  15-30 minutes    Taking medications regularly:  Yes    Barriers to taking medications:  None    Medication side effects:  None    PHQ-2 Total Score: 0    Additional concerns today:  No (Headache medication )      Today's PHQ-2 Score:   PHQ-2 ( 1999 Pfizer) 11/24/2020   Q1: Little interest or pleasure in doing things 0   Q2: Feeling down, depressed or hopeless 0   PHQ-2 Score 0       Abuse: Current or Past (Physical, Sexual or Emotional) - No  Do you feel safe in your environment? Yes    Patient is complaining she has sinus infection  She is having sinus headache  She feels she needs treatment for sinus infection  Whenever she gets sinus infection she get this kind of headache   it is affecting right side of maxillary sinus and right side of the head   when she bends down she feels pain and pressure in her sinuses  She tried Tylenol which did not help  Asking for something stronger that can help her headache  She is using saline nasal spray   She is c also complaining of olored secretions postnasal    Social History     Tobacco Use     Smoking status: Never Smoker     Smokeless tobacco: Never Used   Substance Use Topics     Alcohol use: No     If you drink alcohol do you typically have >3 drinks per day or >7 drinks per week? No    Alcohol Use 11/24/2020   Prescreen: >3 drinks/day or >7 drinks/week? No       Reviewed orders with patient.  Reviewed health maintenance and updated orders accordingly - Yes  Labs reviewed in EPIC        Pertinent  "mammograms are reviewed under the imaging tab.  History of abnormal Pap smear: NO - age 30- 65 PAP every 3 years recommended  PAP / HPV Latest Ref Rng & Units 1/28/2019 5/5/2016   PAP - NIL NIL   HPV 16 DNA NEG:Negative Negative -   HPV 18 DNA NEG:Negative Negative -   OTHER HR HPV NEG:Negative Negative -     Reviewed and updated as needed this visit by clinical staff  Tobacco  Allergies  Meds              Reviewed and updated as needed this visit by Provider                    Review of Systems  CONSTITUTIONAL: NEGATIVE for fever, chills, change in weight  INTEGUMENTARY/SKIN: NEGATIVE for worrisome rashes, moles or lesions  EYES: NEGATIVE for vision changes or irritation  ENT: NEGATIVE for ear, mouth and throat problems  RESP: NEGATIVE for significant cough or SOB  BREAST: NEGATIVE for masses, tenderness or discharge  CV: NEGATIVE for chest pain, palpitations or peripheral edema  GI: NEGATIVE for nausea, abdominal pain, heartburn, or change in bowel habits  : NEGATIVE for unusual urinary or vaginal symptoms. No vaginal bleeding.  MUSCULOSKELETAL: NEGATIVE for significant arthralgias or myalgia  NEURO: NEGATIVE for weakness, dizziness or paresthesias  PSYCHIATRIC: NEGATIVE for changes in mood or affect      OBJECTIVE:   /85 (BP Location: Right arm, Patient Position: Sitting, Cuff Size: Adult Large)   Pulse 70   Temp 96.6  F (35.9  C) (Temporal)   Ht 1.575 m (5' 2.01\")   Wt 93.5 kg (206 lb 3.2 oz)   SpO2 99%   Breastfeeding No   BMI 37.70 kg/m    Physical Exam  GENERAL: healthy, alert and no distress  EYES: Eyes grossly normal to inspection, PERRL and conjunctivae and sclerae normal  HENT: ear canals and TM's normal on left side and on right side she had a cerumen impaction, nose showed turbinate hypertrophy and posterior pharyngeal wall was erythematous  NECK: no adenopathy, no asymmetry, masses, or scars and thyroid normal to palpation  RESP: lungs clear to auscultation - no rales, rhonchi or " wheezes  BREAST: normal without masses, tenderness or nipple discharge and no palpable axillary masses or adenopathy  CV: regular rate and rhythm, normal S1 S2, no S3 or S4, no murmur, click or rub, no peripheral edema and peripheral pulses strong  ABDOMEN: soft, nontender, no hepatosplenomegaly, no masses and bowel sounds normal  MS: no gross musculoskeletal defects noted, no edema  SKIN: no suspicious lesions or rashes  NEURO: Normal strength and tone, mentation intact and speech normal  PSYCH: mentation appears normal, affect normal/bright        ASSESSMENT/PLAN:   Lindsey was seen today for physical and imm/inj.    Diagnoses and all orders for this visit:    Routine general medical examination at a health care facility  Patient had mammogram on June 2020  She had colonoscopy in 2018  She is up-to-date with her immunization  She will get flu shot today  She wants to schedule fasting lab does not want nonfasting labs  She will schedule that at [facility]    Screening for thyroid disorder  -     TSH with free T4 reflex; Future    Screening for deficiency anemia  -     CBC with platelets; Future    Radicular low back pain  Comments:  left side    Per patient with physical therapy it resolved completely    Mixed hyperlipidemia  -     Lipid panel reflex to direct LDL Fasting; Future  -     NUTRITION REFERRAL    Morbid obesity (H)  -     NUTRITION REFERRAL    Meningioma (H)   she has a past history  MRI last year was satisfactory  Her neurological exam is nonfocal    Medication management  -     Comprehensive metabolic panel; Future    Acute non-recurrent maxillary sinusitis  -     amoxicillin-clavulanate (AUGMENTIN) 875-125 MG tablet; Take 1 tablet by mouth 2 times daily  Drink plenty of fluids.  Take extra rest.  Use saline nasal spray.  Take Tylenol as needed for pain  I told the patient if her symptoms does not improve then it is very important she seek medical attention  She has a history of meningioma so I  "always keep that in my mind   at this point she is having typical symptoms of sinus infection if symptoms does not improve then will have to investigate further      Sinus headache  -     butalbital-acetaminophen-caffeine (ESGIC) -40 MG tablet; Take 1 tablet by mouth 2 times daily as needed for headaches  Educated her about this    Screening for viral disease  -     Hepatitis C Screen Reflex to HCV RNA Quant and Genotype; Future    Need for hepatitis C screening test  -     Hepatitis C Screen Reflex to HCV RNA Quant and Genotype; Future    Need for prophylactic vaccination and inoculation against influenza  -     INFLUENZA QUAD, RECOMBINANT, P-FREE (RIV4) (FLUBLOCK) [23464]  -     ADMIN 1st VACCINE    Impacted cerumen of right ear  -     REMOVE IMPACTED CERUMEN    Ear wash was done by nursing staff and wax came out without any problem.    Patient has been advised of split billing requirements and indicates understanding: Yes  COUNSELING:  Reviewed preventive health counseling, as reflected in patient instructions       Regular exercise       Healthy diet/nutrition       Advance Care Planning    Estimated body mass index is 37.7 kg/m  as calculated from the following:    Height as of this encounter: 1.575 m (5' 2.01\").    Weight as of this encounter: 93.5 kg (206 lb 3.2 oz).    Weight management plan: Discussed healthy diet and exercise guidelines    She reports that she has never smoked. She has never used smokeless tobacco.    Disclaimer: This note consists of symbols derived from keyboarding, dictation and/or voice recognition software. As a result, there may be errors in the script that have gone undetected. Please consider this when interpreting information found in this chart.      Counseling Resources:  ATP IV Guidelines  Pooled Cohorts Equation Calculator  Breast Cancer Risk Calculator  BRCA-Related Cancer Risk Assessment: FHS-7 Tool  FRAX Risk Assessment  ICSI Preventive Guidelines  Dietary Guidelines " for Americans, 2010  USDA's MyPlate  ASA Prophylaxis  Lung CA Screening    Maria Guadalupe Serrano MD  [facility]

## 2020-11-24 NOTE — PATIENT INSTRUCTIONS
Schedule labs   Drink plenty of fluids.  Take extra rest.  Use saline nasal spray.  Take Tylenol as needed for pain  Fioricet can be habit-forming. It should be taken as prescribed. Do not mix it  with alcohol. Be careful with driving.Do not loose the  Prescription.  Do not overuse this medication. It is a controlled substance.  Make appointment with dietician  Follow up in one year for physical   Seek sooner medical attention if there is any worsening of symptoms or problems.          Preventive Health Recommendations  Female Ages 50 - 64    Yearly exam: See your health care provider every year in order to  o Review health changes.   o Discuss preventive care.    o Review your medicines if your doctor has prescribed any.      Get a Pap test every three years (unless you have an abnormal result and your provider advises testing more often).    If you get Pap tests with HPV test, you only need to test every 5 years, unless you have an abnormal result.     You do not need a Pap test if your uterus was removed (hysterectomy) and you have not had cancer.    You should be tested each year for STDs (sexually transmitted diseases) if you're at risk.     Have a mammogram every 1 to 2 years.    Have a colonoscopy at age 50, or have a yearly FIT test (stool test). These exams screen for colon cancer.      Have a cholesterol test every 5 years, or more often if advised.    Have a diabetes test (fasting glucose) every three years. If you are at risk for diabetes, you should have this test more often.     If you are at risk for osteoporosis (brittle bone disease), think about having a bone density scan (DEXA).    Shots: Get a flu shot each year. Get a tetanus shot every 10 years.    Nutrition:     Eat at least 5 servings of fruits and vegetables each day.    Eat whole-grain bread, whole-wheat pasta and brown rice instead of white grains and rice.    Get adequate Calcium and Vitamin D.     Lifestyle    Exercise at least 150  minutes a week (30 minutes a day, 5 days a week). This will help you control your weight and prevent disease.    Limit alcohol to one drink per day.    No smoking.     Wear sunscreen to prevent skin cancer.     See your dentist every six months for an exam and cleaning.    See your eye doctor every 1 to 2 years.

## 2020-11-25 NOTE — NURSING NOTE
Patient identified using two patient identifiers.  Ear exam showing wax occlusion completed by provider.  Solution: warm water was placed in the right ear(s) via irrigation tool: elephant ear.      Silvina Moody MA on 11/24/2020 at 6:21 PM   
no

## 2020-12-08 DIAGNOSIS — Z11.59 NEED FOR HEPATITIS C SCREENING TEST: ICD-10-CM

## 2020-12-08 DIAGNOSIS — Z79.899 MEDICATION MANAGEMENT: ICD-10-CM

## 2020-12-08 DIAGNOSIS — E78.2 MIXED HYPERLIPIDEMIA: ICD-10-CM

## 2020-12-08 DIAGNOSIS — Z13.29 SCREENING FOR THYROID DISORDER: ICD-10-CM

## 2020-12-08 DIAGNOSIS — Z13.0 SCREENING FOR DEFICIENCY ANEMIA: ICD-10-CM

## 2020-12-08 DIAGNOSIS — Z11.59 SCREENING FOR VIRAL DISEASE: ICD-10-CM

## 2020-12-08 LAB
ALBUMIN SERPL-MCNC: 3.4 G/DL (ref 3.4–5)
ALP SERPL-CCNC: 98 U/L (ref 40–150)
ALT SERPL W P-5'-P-CCNC: 30 U/L (ref 0–50)
ANION GAP SERPL CALCULATED.3IONS-SCNC: 3 MMOL/L (ref 3–14)
AST SERPL W P-5'-P-CCNC: 22 U/L (ref 0–45)
BILIRUB SERPL-MCNC: 0.3 MG/DL (ref 0.2–1.3)
BUN SERPL-MCNC: 9 MG/DL (ref 7–30)
CALCIUM SERPL-MCNC: 9.3 MG/DL (ref 8.5–10.1)
CHLORIDE SERPL-SCNC: 110 MMOL/L (ref 94–109)
CHOLEST SERPL-MCNC: 219 MG/DL
CO2 SERPL-SCNC: 30 MMOL/L (ref 20–32)
CREAT SERPL-MCNC: 0.62 MG/DL (ref 0.52–1.04)
ERYTHROCYTE [DISTWIDTH] IN BLOOD BY AUTOMATED COUNT: 14.7 % (ref 10–15)
GFR SERPL CREATININE-BSD FRML MDRD: >90 ML/MIN/{1.73_M2}
GLUCOSE SERPL-MCNC: 84 MG/DL (ref 70–99)
HCT VFR BLD AUTO: 39.9 % (ref 35–47)
HCV AB SERPL QL IA: NONREACTIVE
HDLC SERPL-MCNC: 51 MG/DL
HGB BLD-MCNC: 12.6 G/DL (ref 11.7–15.7)
LDLC SERPL CALC-MCNC: 125 MG/DL
MCH RBC QN AUTO: 25.8 PG (ref 26.5–33)
MCHC RBC AUTO-ENTMCNC: 31.6 G/DL (ref 31.5–36.5)
MCV RBC AUTO: 82 FL (ref 78–100)
NONHDLC SERPL-MCNC: 168 MG/DL
PLATELET # BLD AUTO: 237 10E9/L (ref 150–450)
POTASSIUM SERPL-SCNC: 4 MMOL/L (ref 3.4–5.3)
PROT SERPL-MCNC: 7.8 G/DL (ref 6.8–8.8)
RBC # BLD AUTO: 4.89 10E12/L (ref 3.8–5.2)
SODIUM SERPL-SCNC: 143 MMOL/L (ref 133–144)
TRIGL SERPL-MCNC: 214 MG/DL
TSH SERPL DL<=0.005 MIU/L-ACNC: 3.05 MU/L (ref 0.4–4)
WBC # BLD AUTO: 5.9 10E9/L (ref 4–11)

## 2020-12-08 PROCEDURE — 82728 ASSAY OF FERRITIN: CPT | Performed by: INTERNAL MEDICINE

## 2020-12-08 PROCEDURE — 85027 COMPLETE CBC AUTOMATED: CPT | Performed by: INTERNAL MEDICINE

## 2020-12-08 PROCEDURE — 86803 HEPATITIS C AB TEST: CPT | Performed by: INTERNAL MEDICINE

## 2020-12-08 PROCEDURE — 80061 LIPID PANEL: CPT | Performed by: INTERNAL MEDICINE

## 2020-12-08 PROCEDURE — 36415 COLL VENOUS BLD VENIPUNCTURE: CPT | Performed by: INTERNAL MEDICINE

## 2020-12-08 PROCEDURE — 84443 ASSAY THYROID STIM HORMONE: CPT | Performed by: INTERNAL MEDICINE

## 2020-12-08 PROCEDURE — 80053 COMPREHEN METABOLIC PANEL: CPT | Performed by: INTERNAL MEDICINE

## 2020-12-08 NOTE — LETTER
December 9, 2020      Lindsey Garciajavijuancarlos  [address] [facility] [address]        Yony Portillo,     This is to inform you regarding your test result.     Hepatitis C Antibody is negative.   TSH which is thyroid hormone is normal.   Your total cholesterol is elevated.   HDL which is called good cholesterol is normal.   Your LDL which is called bad cholesterol is elevated.   The triglycerides are high. Lowering  the amount of sugar ,alcohol and sweets in the diet helps to control this.Exercise and weight loss helps.   Eat low cholesterol low fat  diet and do regular physical activity.   If you would like to see a dietitian regarding this then please let us know so we can put a referral for you to see a dietitian.   The testing of your blood sugar, kidney function, liver function and electrolytes was satisfactory .   CBC result which includes white count Hemoglobin and  Platelet Counts is normal.       Sincerely,       Dr.Nasima Zach MD,FACP / neal    Resulted Orders   Hepatitis C Screen Reflex to HCV RNA Quant and Genotype   Result Value Ref Range    Hepatitis C Antibody Nonreactive NR^Nonreactive      Comment:      Assay performance characteristics have not been established for newborns,   infants, and children     CBC with platelets   Result Value Ref Range    WBC 5.9 4.0 - 11.0 10e9/L    RBC Count 4.89 3.8 - 5.2 10e12/L    Hemoglobin 12.6 11.7 - 15.7 g/dL    Hematocrit 39.9 35.0 - 47.0 %    MCV 82 78 - 100 fl    MCH 25.8 (L) 26.5 - 33.0 pg    MCHC 31.6 31.5 - 36.5 g/dL    RDW 14.7 10.0 - 15.0 %    Platelet Count 237 150 - 450 10e9/L   Comprehensive metabolic panel   Result Value Ref Range    Sodium 143 133 - 144 mmol/L    Potassium 4.0 3.4 - 5.3 mmol/L    Chloride 110 (H) 94 - 109 mmol/L    Carbon Dioxide 30 20 - 32 mmol/L    Anion Gap 3 3 - 14 mmol/L    Glucose 84 70 - 99 mg/dL    Urea Nitrogen 9 7 - 30 mg/dL    Creatinine 0.62 0.52 - 1.04 mg/dL    GFR Estimate >90 >60 mL/min/[1.73_m2]      Comment:       Non  GFR Calc  Starting 12/18/2018, serum creatinine based estimated GFR (eGFR) will be   calculated using the Chronic Kidney Disease Epidemiology Collaboration   (CKD-EPI) equation.      GFR Estimate If Black >90 >60 mL/min/[1.73_m2]      Comment:       GFR Calc  Starting 12/18/2018, serum creatinine based estimated GFR (eGFR) will be   calculated using the Chronic Kidney Disease Epidemiology Collaboration   (CKD-EPI) equation.      Calcium 9.3 8.5 - 10.1 mg/dL    Bilirubin Total 0.3 0.2 - 1.3 mg/dL    Albumin 3.4 3.4 - 5.0 g/dL    Protein Total 7.8 6.8 - 8.8 g/dL    Alkaline Phosphatase 98 40 - 150 U/L    ALT 30 0 - 50 U/L    AST 22 0 - 45 U/L   Lipid panel reflex to direct LDL Fasting   Result Value Ref Range    Cholesterol 219 (H) <200 mg/dL      Comment:      Desirable:       <200 mg/dl    Triglycerides 214 (H) <150 mg/dL      Comment:      Borderline high:  150-199 mg/dl  High:             200-499 mg/dl  Very high:       >499 mg/dl      HDL Cholesterol 51 >49 mg/dL    LDL Cholesterol Calculated 125 (H) <100 mg/dL      Comment:      Above desirable:  100-129 mg/dl  Borderline High:  130-159 mg/dL  High:             160-189 mg/dL  Very high:       >189 mg/dl      Non HDL Cholesterol 168 (H) <130 mg/dL      Comment:      Above Desirable:  130-159 mg/dl  Borderline high:  160-189 mg/dl  High:             190-219 mg/dl  Very high:       >219 mg/dl     TSH with free T4 reflex   Result Value Ref Range    TSH 3.05 0.40 - 4.00 mU/L

## 2020-12-09 LAB — FERRITIN SERPL-MCNC: 86 NG/ML (ref 8–252)

## 2020-12-09 NOTE — RESULT ENCOUNTER NOTE
Please notify patient by sending following letter with copy of test results      Yony Lindsey,    This is to inform you regarding your test result.    Hepatitis C Antibody is negative.  TSH which is thyroid hormone is normal.  Your total cholesterol is elevated.  HDL which is called good cholesterol is normal.  Your LDL which is called bad cholesterol is elevated.  The triglycerides are high. Lowering  the amount of sugar ,alcohol and sweets in the diet helps to control this.Exercise and weight loss helps.  Eat low cholesterol low fat  diet and do regular physical activity.  If you would like to see a dietitian regarding this then please let us know so we can put a referral for you to see a dietitian.  The testing of your blood sugar, kidney function, liver function and electrolytes was satisfactory .  CBC result which includes white count Hemoglobin and  Platelet Counts is normal.       Sincerely,      Dr.Nasima Zach MD,FACP

## 2020-12-15 ENCOUNTER — VIRTUAL VISIT (OUTPATIENT)
Dept: NUTRITION | Facility: CLINIC | Age: 55
End: 2020-12-15
Payer: COMMERCIAL

## 2020-12-15 DIAGNOSIS — E78.2 MIXED HYPERLIPIDEMIA: Primary | ICD-10-CM

## 2020-12-15 DIAGNOSIS — E66.01 MORBID OBESITY (H): ICD-10-CM

## 2020-12-15 NOTE — PROGRESS NOTES
Says tried to call her insurance to see if it is covered but no one got back to her. Does not want to proceed with today's appointment unless she knows it will be covered by her insurance.  Provided her the billing code ([id number]) and provided scheduling line to call and reschedule this visit if it is covered.  Pt verbalized understanding of concepts discussed and recommendations provided.       Page Villalta RDN, AMANUEL, CDCES

## 2021-03-26 ENCOUNTER — IMMUNIZATION (OUTPATIENT)
Dept: NURSING | Facility: CLINIC | Age: 56
End: 2021-03-26
Payer: COMMERCIAL

## 2021-03-26 PROCEDURE — 91300 PR COVID VAC PFIZER DIL RECON 30 MCG/0.3 ML IM: CPT

## 2021-03-26 PROCEDURE — 0001A PR COVID VAC PFIZER DIL RECON 30 MCG/0.3 ML IM: CPT

## 2021-04-14 ENCOUNTER — NURSE TRIAGE (OUTPATIENT)
Dept: FAMILY MEDICINE | Facility: CLINIC | Age: 56
End: 2021-04-14

## 2021-04-14 ENCOUNTER — VIRTUAL VISIT (OUTPATIENT)
Dept: FAMILY MEDICINE | Facility: CLINIC | Age: 56
End: 2021-04-14
Payer: COMMERCIAL

## 2021-04-14 DIAGNOSIS — J01.00 ACUTE NON-RECURRENT MAXILLARY SINUSITIS: ICD-10-CM

## 2021-04-14 PROCEDURE — 99213 OFFICE O/P EST LOW 20 MIN: CPT | Mod: 95 | Performed by: INTERNAL MEDICINE

## 2021-04-14 RX ORDER — PREDNISONE 20 MG/1
20 TABLET ORAL DAILY
Qty: 7 TABLET | Refills: 0 | Status: SHIPPED | OUTPATIENT
Start: 2021-04-14 | End: 2021-07-05

## 2021-04-14 NOTE — PROGRESS NOTES
Ara is a 55 year old who is being evaluated via a billable video visit.      How would you like to obtain your AVS? Mail a copy  If the video visit is dropped, the invitation should be resent by: Text to cell phone: [phone]  Will anyone else be joining your video visit? No    Video Start Time: 10:00 AM    Assessment & Plan     Acute non-recurrent frontal sinusitis  Patient with a history of sinusitis has developed pressure at the level of The Nose above the left eye.  She has had a history of sinusitis historically.  Patient has occasional discharge but now feels like there is increase in pressure pseudoephedrine has given her a very slight improvement of her symptoms.  - amoxicillin-clavulanate (AUGMENTIN) 875-125 MG tablet; Take 1 tablet by mouth 2 times daily  - predniSONE (DELTASONE) 20 MG tablet; Take 1 tablet (20 mg) by mouth daily       See Patient Instructions    No follow-ups on file.    Dyllan Renee MD  [facility]    Subjective   Ara is a 55 year old who presents for the following health issues     HPI 55-year-old presents to the clinic today with concerns of sinus pressure and headache.  She has had sinusitis in the past which responded to antibiotics.  Her symptoms are similar.    New Patient/Transfer of Care  New Patient/Transfer of Care    Review of Systems   Constitutional, HEENT, cardiovascular, pulmonary, gi and gu systems are negative, except as otherwise noted.      Objective           Vitals:  No vitals were obtained today due to virtual visit.    Telephone visit only.    Patient in no apparent distress    Speech is fluent            Video-Visit Details    Type of service:  Video Visit    Video End Time: Video was not initiated this was an audio visit    Originating Location (pt. Location):     Distant Location (provider location):  [facility]     Platform used for Video Visit: Unable to complete video visit

## 2021-04-14 NOTE — TELEPHONE ENCOUNTER
"Pt calling with headache for the past 2 days and taking tylenol extra strength every 12 hours with little relief.  Pt is requesting video visit to get medication for possible sinus infection.    Scheduled with Dr. Renee at 9:00 am.    Gricel LOVE RN  EP Triage    Reason for Disposition    [1] MODERATE headache (e.g., interferes with normal activities) AND [2] present > 24 hours AND [3] unexplained  (Exceptions: analgesics not tried, typical migraine, or headache part of viral illness)    Additional Information    Negative: Difficult to awaken or acting confused (e.g., disoriented, slurred speech)    Negative: [1] Weakness of the face, arm or leg on one side of the body AND [2] new onset    Negative: [1] Numbness of the face, arm or leg on one side of the body AND [2] new onset    Negative: [1] Loss of speech or garbled speech AND [2] new onset    Negative: Passed out (i.e., lost consciousness, collapsed and was not responding)    Negative: Sounds like a life-threatening emergency to the triager    Negative: Followed a head injury    Negative: Pregnant    Negative: Postpartum (from 0 to 6 weeks after delivery)    Negative: Traumatic Brain Injury (TBI) is suspected    Negative: Unable to walk, or can only walk with assistance (e.g., requires support)    Negative: Stiff neck (can't touch chin to chest)    Negative: Severe pain in one eye    Negative: [1] Other family members (or roommates) with headaches AND [2] possibility of carbon monoxide exposure    Negative: [1] SEVERE headache (e.g., excruciating) AND [2] \"worst headache\" of life    Negative: [1] SEVERE headache AND [2] sudden-onset (i.e., reaching maximum intensity within seconds)    Negative: [1] SEVERE headache AND [2] fever    Negative: Loss of vision or double vision (Exception: same as prior migraines)    Negative: [1] Fever > 100.0 F (37.8 C) AND [2] diabetes mellitus or weak immune system (e.g., HIV positive, cancer chemo, splenectomy, organ transplant, " "chronic steroids)    Negative: Patient sounds very sick or weak to the triager    Negative: [1] SEVERE headache (e.g., excruciating) AND [2] not improved after 2 hours of pain medicine    Negative: [1] Vomiting AND [2] 2 or more times (Exception: similar to previous migraines)    Negative: Fever > 104 F (40 C)    Answer Assessment - Initial Assessment Questions  1. LOCATION: \"Where does it hurt?\"       Right side of head from her nose  2. ONSET: \"When did the headache start?\" (Minutes, hours or days)       2 days  3. PATTERN: \"Does the pain come and go, or has it been constant since it started?\"      Comes and goes  4. SEVERITY: \"How bad is the pain?\" and \"What does it keep you from doing?\"  (e.g., Scale 1-10; mild, moderate, or severe)    - MILD (1-3): doesn't interfere with normal activities     - MODERATE (4-7): interferes with normal activities or awakens from sleep     - SEVERE (8-10): excruciating pain, unable to do any normal activities         moderate  5. RECURRENT SYMPTOM: \"Have you ever had headaches before?\" If so, ask: \"When was the last time?\" and \"What happened that time?\"       Yes and states Dr. Serrano gave medication for sinuses  6. CAUSE: \"What do you think is causing the headache?\"      Not sure but thinks may be from sinuses  7. MIGRAINE: \"Have you been diagnosed with migraine headaches?\" If so, ask: \"Is this headache similar?\"       no  8. HEAD INJURY: \"Has there been any recent injury to the head?\"       no  9. OTHER SYMPTOMS: \"Do you have any other symptoms?\" (fever, stiff neck, eye pain, sore throat, cold symptoms)      no  10. PREGNANCY: \"Is there any chance you are pregnant?\" \"When was your last menstrual period?\"        no    Protocols used: HEADACHE-A-AH    SEE PCP WITHIN 24 HOURS:   * IF OFFICE WILL BE OPEN: You need to be seen within the next 24 hours. Call your doctor (or NP/PA) when the office opens and make an appointment.  * IF OFFICE WILL BE CLOSED AND NO PCP (PRIMARY CARE " PROVIDER) SECOND-LEVEL TRIAGE: You need to be seen within the next 24 hours. A clinic or an urgent care center is often a good source of care if your doctor's office is closed or you can't get an appointment.  * IF OFFICE WILL BE CLOSED AND PCP SECOND-LEVEL TRIAGE REQUIRED: You may need to be seen within the next 24 hours. Your doctor (or NP/PA) will want to talk with you to decide what's best. I'll page the on-call provider now. NOTE: Since this isn't serious, hold the page between 10 pm and 7 am. Page the on-call provider in the morning.  * IF PATIENT HAS NO PCP: Refer patient to a clinic or urgent care center. Also try to help caller find a PCP for future care.      Patient/Caregiver understands and will follow care advice? Yes, plans to follow advice

## 2021-04-16 ENCOUNTER — IMMUNIZATION (OUTPATIENT)
Dept: NURSING | Facility: CLINIC | Age: 56
End: 2021-04-16
Attending: INTERNAL MEDICINE
Payer: COMMERCIAL

## 2021-04-16 PROCEDURE — 91300 PR COVID VAC PFIZER DIL RECON 30 MCG/0.3 ML IM: CPT

## 2021-04-16 PROCEDURE — 0002A PR COVID VAC PFIZER DIL RECON 30 MCG/0.3 ML IM: CPT

## 2021-07-05 ENCOUNTER — OFFICE VISIT (OUTPATIENT)
Dept: SURGERY | Facility: CLINIC | Age: 56
End: 2021-07-05
Payer: COMMERCIAL

## 2021-07-05 VITALS
HEART RATE: 58 BPM | SYSTOLIC BLOOD PRESSURE: 100 MMHG | DIASTOLIC BLOOD PRESSURE: 60 MMHG | BODY MASS INDEX: 37.49 KG/M2 | WEIGHT: 205 LBS

## 2021-07-05 DIAGNOSIS — Z87.19 S/P REPAIR OF VENTRAL HERNIA: Primary | ICD-10-CM

## 2021-07-05 DIAGNOSIS — Z98.890 S/P REPAIR OF VENTRAL HERNIA: Primary | ICD-10-CM

## 2021-07-05 PROBLEM — K43.6 INCARCERATED VENTRAL HERNIA: Status: ACTIVE | Noted: 2021-06-15

## 2021-07-05 PROCEDURE — 99024 POSTOP FOLLOW-UP VISIT: CPT | Performed by: SURGERY

## 2021-07-05 NOTE — LETTER
7/5/2021         RE: Lindsey Fuentes  [address] [address]        Dear Colleague,    Thank you for referring your patient, Lindsey Fuentes, to the [facility]. Please see a copy of my visit note below.    General Surgery Post Op    Pt returns for follow up visit s/p open ventral hernia repair with mesh on 6/15/2021.    Patient has been doing well, tolerating diet. Bowels moving well. Pain controlled. No issues with wound healing/redness/drainage. No fevers.  Can get a pain read in the ribs on the right and left side fairly lateral to any area of the repair, notices when sitting up.  Is improving with time however.    Physical exam: Vitals: /60   Pulse 58   Wt 93 kg (205 lb)   BMI 37.49 kg/m    BMI= Body mass index is 37.49 kg/m .    Exam:  Constitutional: healthy, alert and no distress  Gastrointestinal: Abdomen soft, non-tender. BS normal. No masses, organomegaly, positive findings: obese  Supraumbilical incision healing well no signs of infection.  There are some firm tissue ridge underneath the incision itself which is expected part of healing.  No hernia defects felt.  No signs of infection.      Assessment:     ICD-10-CM    1. S/P repair of ventral hernia  Z98.890     Z87.19      Plan: Overall recovering well.  I am okay with her starting to increase her activities and lifting as tolerated.  Follow-up with me as needed.    Dyllan Peoples MD          Again, thank you for allowing me to participate in the care of your patient.        Sincerely,        Dyllan Peoples MD

## 2021-07-05 NOTE — PROGRESS NOTES
General Surgery Post Op    Pt returns for follow up visit s/p open ventral hernia repair with mesh on 6/15/2021.    Patient has been doing well, tolerating diet. Bowels moving well. Pain controlled. No issues with wound healing/redness/drainage. No fevers.  Can get a pain read in the ribs on the right and left side fairly lateral to any area of the repair, notices when sitting up.  Is improving with time however.    Physical exam: Vitals: /60   Pulse 58   Wt 93 kg (205 lb)   BMI 37.49 kg/m    BMI= Body mass index is 37.49 kg/m .    Exam:  Constitutional: healthy, alert and no distress  Gastrointestinal: Abdomen soft, non-tender. BS normal. No masses, organomegaly, positive findings: obese  Supraumbilical incision healing well no signs of infection.  There are some firm tissue ridge underneath the incision itself which is expected part of healing.  No hernia defects felt.  No signs of infection.      Assessment:     ICD-10-CM    1. S/P repair of ventral hernia  Z98.890     Z87.19      Plan: Overall recovering well.  I am okay with her starting to increase her activities and lifting as tolerated.  Follow-up with me as needed.    Dyllan Peoples MD

## 2021-10-03 ENCOUNTER — HEALTH MAINTENANCE LETTER (OUTPATIENT)
Age: 56
End: 2021-10-03

## 2021-10-04 ENCOUNTER — IMMUNIZATION (OUTPATIENT)
Dept: FAMILY MEDICINE | Facility: CLINIC | Age: 56
End: 2021-10-04
Payer: COMMERCIAL

## 2021-10-04 DIAGNOSIS — Z23 NEED FOR PROPHYLACTIC VACCINATION AND INOCULATION AGAINST INFLUENZA: Primary | ICD-10-CM

## 2021-10-04 PROCEDURE — 90682 RIV4 VACC RECOMBINANT DNA IM: CPT

## 2021-10-04 PROCEDURE — 99207 PR NO CHARGE NURSE ONLY: CPT

## 2021-10-04 PROCEDURE — 90471 IMMUNIZATION ADMIN: CPT

## 2021-10-04 NOTE — PROGRESS NOTES
Prior to immunization administration, verified patients identity using patient s name and date of birth. Please see Immunization Activity for additional information.     Screening Questionnaire for Adult Immunization    Are you sick today?   No   Do you have allergies to medications, food, a vaccine component or latex?   No   Have you ever had a serious reaction after receiving a vaccination?   No   Do you have a long-term health problem with heart, lung, kidney, or metabolic disease (e.g., diabetes), asthma, a blood disorder, no spleen, complement component deficiency, a cochlear implant, or a spinal fluid leak?  Are you on long-term aspirin therapy?   No   Do you have cancer, leukemia, HIV/AIDS, or any other immune system problem?   No   Do you have a parent, brother, or sister with an immune system problem?   No   In the past 3 months, have you taken medications that affect  your immune system, such as prednisone, other steroids, or anticancer drugs; drugs for the treatment of rheumatoid arthritis, Crohn s disease, or psoriasis; or have you had radiation treatments?   No   Have you had a seizure, or a brain or other nervous system problem?   No   During the past year, have you received a transfusion of blood or blood    products, or been given immune (gamma) globulin or antiviral drug?   No   For women: Are you pregnant or is there a chance you could become       pregnant during the next month?   No   Have you received any vaccinations in the past 4 weeks?   No     Immunization questionnaire answers were all negative.        Per orders of Dr. Serrano, injection of flublok given by Tita Archer. Patient instructed to remain in clinic for 15 minutes afterwards, and to report any adverse reaction to me immediately.       Screening performed by Tita Archer on 10/4/2021 at 10:47 AM.

## 2021-10-05 ENCOUNTER — ANCILLARY PROCEDURE (OUTPATIENT)
Dept: MAMMOGRAPHY | Facility: CLINIC | Age: 56
End: 2021-10-05
Attending: INTERNAL MEDICINE
Payer: COMMERCIAL

## 2021-10-05 DIAGNOSIS — Z12.31 VISIT FOR SCREENING MAMMOGRAM: ICD-10-CM

## 2021-10-05 PROCEDURE — 77067 SCR MAMMO BI INCL CAD: CPT | Mod: GC | Performed by: STUDENT IN AN ORGANIZED HEALTH CARE EDUCATION/TRAINING PROGRAM

## 2021-10-06 NOTE — RESULT ENCOUNTER NOTE
Yony Portillo,    This is to inform you regarding your test result.    Mammogram result is satisfactory .  Recommendation: annual mammography      Sincerely,      Dr.Nasima Zach MD,FACP

## 2021-11-02 ENCOUNTER — IMMUNIZATION (OUTPATIENT)
Dept: NURSING | Facility: CLINIC | Age: 56
End: 2021-11-02
Payer: COMMERCIAL

## 2021-11-02 PROCEDURE — 0004A PR COVID VAC PFIZER DIL RECON 30 MCG/0.3 ML IM: CPT

## 2021-11-02 PROCEDURE — 91300 PR COVID VAC PFIZER DIL RECON 30 MCG/0.3 ML IM: CPT

## 2022-01-23 ENCOUNTER — HEALTH MAINTENANCE LETTER (OUTPATIENT)
Age: 57
End: 2022-01-23

## 2022-05-20 ENCOUNTER — IMMUNIZATION (OUTPATIENT)
Dept: NURSING | Facility: CLINIC | Age: 57
End: 2022-05-20
Payer: COMMERCIAL

## 2022-05-20 PROCEDURE — 0054A COVID-19,PF,PFIZER (12+ YRS): CPT

## 2022-05-20 PROCEDURE — 91305 COVID-19,PF,PFIZER (12+ YRS): CPT

## 2022-06-15 ENCOUNTER — LAB (OUTPATIENT)
Dept: LAB | Facility: CLINIC | Age: 57
End: 2022-06-15
Payer: COMMERCIAL

## 2022-06-15 DIAGNOSIS — Z79.899 MEDICATION MANAGEMENT: ICD-10-CM

## 2022-06-15 DIAGNOSIS — Z13.29 SCREENING FOR THYROID DISORDER: ICD-10-CM

## 2022-06-15 DIAGNOSIS — Z13.0 SCREENING FOR DEFICIENCY ANEMIA: ICD-10-CM

## 2022-06-15 DIAGNOSIS — Z13.0 SCREENING FOR DEFICIENCY ANEMIA: Primary | ICD-10-CM

## 2022-06-15 DIAGNOSIS — E78.2 MIXED HYPERLIPIDEMIA: ICD-10-CM

## 2022-06-15 LAB
ALBUMIN SERPL-MCNC: 3.5 G/DL (ref 3.4–5)
ALP SERPL-CCNC: 94 U/L (ref 40–150)
ALT SERPL W P-5'-P-CCNC: 31 U/L (ref 0–50)
ANION GAP SERPL CALCULATED.3IONS-SCNC: 4 MMOL/L (ref 3–14)
AST SERPL W P-5'-P-CCNC: 30 U/L (ref 0–45)
BILIRUB SERPL-MCNC: 0.2 MG/DL (ref 0.2–1.3)
BUN SERPL-MCNC: 8 MG/DL (ref 7–30)
CALCIUM SERPL-MCNC: 9.6 MG/DL (ref 8.5–10.1)
CHLORIDE BLD-SCNC: 107 MMOL/L (ref 94–109)
CHOLEST SERPL-MCNC: 188 MG/DL
CO2 SERPL-SCNC: 29 MMOL/L (ref 20–32)
CREAT SERPL-MCNC: 0.66 MG/DL (ref 0.52–1.04)
ERYTHROCYTE [DISTWIDTH] IN BLOOD BY AUTOMATED COUNT: 14.8 % (ref 10–15)
FASTING STATUS PATIENT QL REPORTED: YES
FERRITIN SERPL-MCNC: 78 NG/ML (ref 8–252)
GFR SERPL CREATININE-BSD FRML MDRD: >90 ML/MIN/1.73M2
GLUCOSE BLD-MCNC: 93 MG/DL (ref 70–99)
HCT VFR BLD AUTO: 40.5 % (ref 35–47)
HDLC SERPL-MCNC: 58 MG/DL
HGB BLD-MCNC: 12.6 G/DL (ref 11.7–15.7)
LDLC SERPL CALC-MCNC: 101 MG/DL
MCH RBC QN AUTO: 25.6 PG (ref 26.5–33)
MCHC RBC AUTO-ENTMCNC: 31.1 G/DL (ref 31.5–36.5)
MCV RBC AUTO: 82 FL (ref 78–100)
NONHDLC SERPL-MCNC: 130 MG/DL
PLATELET # BLD AUTO: 249 10E3/UL (ref 150–450)
POTASSIUM BLD-SCNC: 4.3 MMOL/L (ref 3.4–5.3)
PROT SERPL-MCNC: 7.6 G/DL (ref 6.8–8.8)
RBC # BLD AUTO: 4.93 10E6/UL (ref 3.8–5.2)
SODIUM SERPL-SCNC: 140 MMOL/L (ref 133–144)
TRIGL SERPL-MCNC: 145 MG/DL
TSH SERPL DL<=0.005 MIU/L-ACNC: 2.86 MU/L (ref 0.4–4)
WBC # BLD AUTO: 6.5 10E3/UL (ref 4–11)

## 2022-06-15 PROCEDURE — 80053 COMPREHEN METABOLIC PANEL: CPT

## 2022-06-15 PROCEDURE — 84443 ASSAY THYROID STIM HORMONE: CPT

## 2022-06-15 PROCEDURE — 82728 ASSAY OF FERRITIN: CPT

## 2022-06-15 PROCEDURE — 80061 LIPID PANEL: CPT

## 2022-06-15 PROCEDURE — 36415 COLL VENOUS BLD VENIPUNCTURE: CPT

## 2022-06-15 PROCEDURE — 85027 COMPLETE CBC AUTOMATED: CPT

## 2022-06-15 NOTE — RESULT ENCOUNTER NOTE
Yony Portillo,    This is to inform you regarding your test result.    TSH which is thyroid hormone is normal.  Your total cholesterol is normal.  HDL which is called good cholesterol is normal.  Your LDL cholesterol is elevated.  Your triglycerides are normal.  All numbers are improving.  The testing of your kidney function, liver function and electrolytes was satisfactory   CBC result which includes Hemoglobin and  Platelet Counts is satisfactory.        Sincerely,      Dr.Nasima Zach MD,FACP

## 2022-06-16 NOTE — RESULT ENCOUNTER NOTE
Yony Portillo,    This is to inform you regarding your test result.    Ferritin which is iron stores in the body is normal.      Sincerely,      Dr.Nasima Zach MD,FACP

## 2022-06-22 ASSESSMENT — ENCOUNTER SYMPTOMS: BREAST MASS: 0

## 2022-06-24 ENCOUNTER — OFFICE VISIT (OUTPATIENT)
Dept: FAMILY MEDICINE | Facility: CLINIC | Age: 57
End: 2022-06-24
Payer: COMMERCIAL

## 2022-06-24 VITALS
HEART RATE: 58 BPM | HEIGHT: 62 IN | OXYGEN SATURATION: 100 % | BODY MASS INDEX: 37.17 KG/M2 | SYSTOLIC BLOOD PRESSURE: 138 MMHG | DIASTOLIC BLOOD PRESSURE: 84 MMHG | TEMPERATURE: 97.7 F | RESPIRATION RATE: 16 BRPM | WEIGHT: 202 LBS

## 2022-06-24 DIAGNOSIS — Z12.4 CERVICAL CANCER SCREENING: ICD-10-CM

## 2022-06-24 DIAGNOSIS — Z86.018 S/P RESECTION OF MENINGIOMA: ICD-10-CM

## 2022-06-24 DIAGNOSIS — J30.2 SEASONAL ALLERGIES: ICD-10-CM

## 2022-06-24 DIAGNOSIS — E66.01 MORBID OBESITY (H): ICD-10-CM

## 2022-06-24 DIAGNOSIS — E78.5 HYPERLIPIDEMIA, UNSPECIFIED HYPERLIPIDEMIA TYPE: ICD-10-CM

## 2022-06-24 DIAGNOSIS — D32.9 MENINGIOMA (H): ICD-10-CM

## 2022-06-24 DIAGNOSIS — Z00.00 ROUTINE GENERAL MEDICAL EXAMINATION AT A HEALTH CARE FACILITY: Primary | ICD-10-CM

## 2022-06-24 DIAGNOSIS — Z98.890 S/P RESECTION OF MENINGIOMA: ICD-10-CM

## 2022-06-24 PROCEDURE — G0145 SCR C/V CYTO,THINLAYER,RESCR: HCPCS | Performed by: INTERNAL MEDICINE

## 2022-06-24 PROCEDURE — 87624 HPV HI-RISK TYP POOLED RSLT: CPT | Performed by: INTERNAL MEDICINE

## 2022-06-24 PROCEDURE — 99213 OFFICE O/P EST LOW 20 MIN: CPT | Mod: 25 | Performed by: INTERNAL MEDICINE

## 2022-06-24 PROCEDURE — 99396 PREV VISIT EST AGE 40-64: CPT | Performed by: INTERNAL MEDICINE

## 2022-06-24 RX ORDER — CETIRIZINE HYDROCHLORIDE 10 MG/1
10 TABLET ORAL DAILY PRN
Qty: 90 TABLET | Refills: 1 | Status: SHIPPED | OUTPATIENT
Start: 2022-06-24 | End: 2024-10-02

## 2022-06-24 ASSESSMENT — PAIN SCALES - GENERAL: PAINLEVEL: NO PAIN (0)

## 2022-06-24 ASSESSMENT — ENCOUNTER SYMPTOMS: BREAST MASS: 0

## 2022-06-24 NOTE — PROGRESS NOTES
SUBJECTIVE:   CC: Lindsey Fuentes is an 56 year old woman who presents for preventive health visit.       Patient has been advised of split billing requirements and indicates understanding: Yes  Healthy Habits:     Getting at least 3 servings of Calcium per day:  Yes    Bi-annual eye exam:  Yes    Dental care twice a year:  Yes    Sleep apnea or symptoms of sleep apnea:  None    Diet:  Regular (no restrictions)    Frequency of exercise:  4-5 days/week    Duration of exercise:  15-30 minutes    PHQ-2 Total Score: 0        Today's PHQ-2 Score:   PHQ-2 ( 1999 Pfizer) 6/24/2022   Q1: Little interest or pleasure in doing things 0   Q2: Feeling down, depressed or hopeless 0   PHQ-2 Score 0   PHQ-2 Total Score (12-17 Years)- Positive if 3 or more points; Administer PHQ-A if positive -   Q1: Little interest or pleasure in doing things Not at all   Q2: Feeling down, depressed or hopeless Not at all   PHQ-2 Score 0       Abuse: Current or Past (Physical, Sexual or Emotional) - No  Do you feel safe in your environment? Yes    Have you ever done Advance Care Planning? (For example, a Health Directive, POLST, or a discussion with a medical provider or your loved ones about your wishes): No, advance care planning information given to patient to review.  Patient plans to discuss their wishes with loved ones or provider.      Social History     Tobacco Use     Smoking status: Never Smoker     Smokeless tobacco: Never Used   Substance Use Topics     Alcohol use: No         No flowsheet data found.    Reviewed orders with patient.  Reviewed health maintenance and updated orders accordingly - Yes  Labs reviewed in EPIC    Breast Cancer Screening:    Breast CA Risk Assessment (FHS-7) 6/24/2022   Do you have a family history of breast, colon, or ovarian cancer? No / Unknown           Pertinent mammograms are reviewed under the imaging tab.    History of abnormal Pap smear: NO - age 30-65 PAP every 5 years with negative HPV  "co-testing recommended  PAP / HPV Latest Ref Rng & Units 1/28/2019 5/5/2016   PAP (Historical) - NIL NIL   HPV16 NEG:Negative Negative -   HPV18 NEG:Negative Negative -   HRHPV NEG:Negative Negative -     Reviewed and updated as needed this visit by clinical staff   Tobacco  Allergies  Meds                Reviewed and updated as needed this visit by Provider                       Review of Systems   Breasts:  Negative for tenderness, breast mass and discharge.   Genitourinary: Negative for pelvic pain, vaginal bleeding and vaginal discharge.     Back pain has improved     OBJECTIVE:   /84   Pulse 58   Temp 97.7  F (36.5  C) (Temporal)   Resp 16   Ht 1.575 m (5' 2.01\")   Wt 91.6 kg (202 lb)   SpO2 100%   BMI 36.93 kg/m    Physical Exam  GENERAL: healthy, alert and no distress  EYES: Eyes grossly normal to inspection, PERRL and conjunctivae and sclerae normal  HENT: ear canals and TM's normal, nose and mouth without ulcers or lesions  NECK: no adenopathy, no asymmetry, masses, or scars and thyroid normal to palpation  RESP: lungs clear to auscultation - no rales, rhonchi or wheezes  BREAST: normal without masses, tenderness or nipple discharge and no palpable axillary masses or adenopathy  CV: regular rate and rhythm, normal S1 S2, no S3 or S4, no murmur, click or rub, no peripheral edema and peripheral pulses strong  ABDOMEN: soft, nontender, no hepatosplenomegaly, no masses and bowel sounds normal   (female): normal female external genitalia, normal urethral meatus, vaginal mucosa pink, moist, well rugated, and normal cervix/adnexa/uterus without masses or discharge  Pap test was performed  MS: no gross musculoskeletal defects noted, no edema  SKIN: no suspicious lesions or rashes  NEURO: Normal strength and tone, mentation intact and speech normal  PSYCH: mentation appears normal, affect normal/bright        ASSESSMENT/PLAN:   Lindsey was seen today for physical.    Diagnoses and all orders for " "this visit:    Routine general medical examination at a health care facility  Preventive health counseling was also done.  Mammogram was done on 10/05/2021  Colonoscopy was done on 01/15/2018    Cervical cancer screening  -     Pap Screen with HPV - recommended age 30 - 65 years    Meningioma (H)  Past history she had resection  Last MRI on 10/08/2019    S/P resection of meningioma    Morbid obesity (H)  Patient is working on healthy diet and exercise    Hyperlipidemia, unspecified hyperlipidemia type  Low-cholesterol low-fat diet    Seasonal allergies  -     cetirizine (ZYRTEC) 10 MG tablet; Take 1 tablet (10 mg) by mouth daily as needed for allergies  Patient has seasonal allergies  She is requesting Zyrtec on as-needed basis    Other orders  -     REVIEW OF HEALTH MAINTENANCE PROTOCOL ORDERS    Disclaimer: This note consists of symbols derived from keyboarding, dictation and/or voice recognition software. As a result, there may be errors in the script that have gone undetected. Please consider this when interpreting information found in this chart.      Patient has been advised of split billing requirements and indicates understanding: Yes    COUNSELING:  Reviewed preventive health counseling, as reflected in patient instructions       Regular exercise       Healthy diet/nutrition       Osteoporosis prevention/bone health    Estimated body mass index is 36.93 kg/m  as calculated from the following:    Height as of this encounter: 1.575 m (5' 2.01\").    Weight as of this encounter: 91.6 kg (202 lb).    Weight management plan: Discussed healthy diet and exercise guidelines    She reports that she has never smoked. She has never used smokeless tobacco.      Counseling Resources:  ATP IV Guidelines  Pooled Cohorts Equation Calculator  Breast Cancer Risk Calculator  BRCA-Related Cancer Risk Assessment: FHS-7 Tool  FRAX Risk Assessment  ICSI Preventive Guidelines  Dietary Guidelines for Americans, 2010  USDA's " MyPlate  ASA Prophylaxis  Lung CA Screening    Maria Guadalupe Serrano MD  [facility]

## 2022-06-24 NOTE — PATIENT INSTRUCTIONS
Monitor your blood pressure once a week  at home.  Bring those readings on your next visit.  Notify us if your blood pressure readings consistently stays greater than 140/90.  Follow up in one year for physical   Seek sooner medical attention if there is any worsening of symptoms or problems.     Preventive Health Recommendations  Female Ages 50 - 64    Yearly exam: See your health care provider every year in order to  Review health changes.   Discuss preventive care.    Review your medicines if your doctor has prescribed any.    Get a Pap test every three years (unless you have an abnormal result and your provider advises testing more often).  If you get Pap tests with HPV test, you only need to test every 5 years, unless you have an abnormal result.   You do not need a Pap test if your uterus was removed (hysterectomy) and you have not had cancer.  You should be tested each year for STDs (sexually transmitted diseases) if you're at risk.   Have a mammogram every 1 to 2 years.  Have a colonoscopy at age 50, or have a yearly FIT test (stool test). These exams screen for colon cancer.    Have a cholesterol test every 5 years, or more often if advised.  Have a diabetes test (fasting glucose) every three years. If you are at risk for diabetes, you should have this test more often.   If you are at risk for osteoporosis (brittle bone disease), think about having a bone density scan (DEXA).    Shots: Get a flu shot each year. Get a tetanus shot every 10 years.    Nutrition:   Eat at least 5 servings of fruits and vegetables each day.  Eat whole-grain bread, whole-wheat pasta and brown rice instead of white grains and rice.  Get adequate Calcium and Vitamin D.     Lifestyle  Exercise at least 150 minutes a week (30 minutes a day, 5 days a week). This will help you control your weight and prevent disease.  Limit alcohol to one drink per day.  No smoking.   Wear sunscreen to prevent skin cancer.   See your dentist every six  months for an exam and cleaning.  See your eye doctor every 1 to 2 years.

## 2022-06-28 LAB
BKR LAB AP GYN ADEQUACY: NORMAL
BKR LAB AP GYN INTERPRETATION: NORMAL
BKR LAB AP HPV REFLEX: NORMAL
BKR LAB AP PREVIOUS ABNORMAL: NORMAL
PATH REPORT.COMMENTS IMP SPEC: NORMAL
PATH REPORT.COMMENTS IMP SPEC: NORMAL
PATH REPORT.RELEVANT HX SPEC: NORMAL

## 2022-06-30 LAB
HUMAN PAPILLOMA VIRUS 16 DNA: NEGATIVE
HUMAN PAPILLOMA VIRUS 18 DNA: NEGATIVE
HUMAN PAPILLOMA VIRUS FINAL DIAGNOSIS: NORMAL
HUMAN PAPILLOMA VIRUS OTHER HR: NEGATIVE

## 2022-09-10 ENCOUNTER — HEALTH MAINTENANCE LETTER (OUTPATIENT)
Age: 57
End: 2022-09-10

## 2023-05-25 ENCOUNTER — PATIENT OUTREACH (OUTPATIENT)
Dept: CARE COORDINATION | Facility: CLINIC | Age: 58
End: 2023-05-25

## 2023-06-09 ENCOUNTER — PATIENT OUTREACH (OUTPATIENT)
Dept: CARE COORDINATION | Facility: CLINIC | Age: 58
End: 2023-06-09

## 2023-07-20 ENCOUNTER — OFFICE VISIT (OUTPATIENT)
Dept: FAMILY MEDICINE | Facility: CLINIC | Age: 58
End: 2023-07-20
Payer: COMMERCIAL

## 2023-07-20 VITALS
DIASTOLIC BLOOD PRESSURE: 69 MMHG | HEART RATE: 66 BPM | WEIGHT: 203.56 LBS | RESPIRATION RATE: 16 BRPM | BODY MASS INDEX: 38.43 KG/M2 | TEMPERATURE: 98 F | OXYGEN SATURATION: 100 % | SYSTOLIC BLOOD PRESSURE: 116 MMHG | HEIGHT: 61 IN

## 2023-07-20 DIAGNOSIS — E66.812 CLASS 2 OBESITY DUE TO EXCESS CALORIES WITHOUT SERIOUS COMORBIDITY WITH BODY MASS INDEX (BMI) OF 38.0 TO 38.9 IN ADULT: ICD-10-CM

## 2023-07-20 DIAGNOSIS — Z00.00 ANNUAL PHYSICAL EXAM: Primary | ICD-10-CM

## 2023-07-20 DIAGNOSIS — R20.0 NUMBNESS OF RIGHT HAND: ICD-10-CM

## 2023-07-20 DIAGNOSIS — Z12.31 VISIT FOR SCREENING MAMMOGRAM: ICD-10-CM

## 2023-07-20 DIAGNOSIS — Z23 NEED FOR HEPATITIS B VACCINATION: ICD-10-CM

## 2023-07-20 DIAGNOSIS — Z13.220 SCREENING FOR LIPID DISORDERS: ICD-10-CM

## 2023-07-20 DIAGNOSIS — E66.09 CLASS 2 OBESITY DUE TO EXCESS CALORIES WITHOUT SERIOUS COMORBIDITY WITH BODY MASS INDEX (BMI) OF 38.0 TO 38.9 IN ADULT: ICD-10-CM

## 2023-07-20 PROBLEM — E66.01 CLASS 2 SEVERE OBESITY DUE TO EXCESS CALORIES WITH SERIOUS COMORBIDITY IN ADULT (H): Status: ACTIVE | Noted: 2023-07-20

## 2023-07-20 PROBLEM — K43.6 INCARCERATED VENTRAL HERNIA: Status: RESOLVED | Noted: 2021-06-15 | Resolved: 2023-07-20

## 2023-07-20 PROBLEM — E66.01 CLASS 2 SEVERE OBESITY DUE TO EXCESS CALORIES WITH SERIOUS COMORBIDITY IN ADULT (H): Status: RESOLVED | Noted: 2023-07-20 | Resolved: 2023-07-20

## 2023-07-20 PROBLEM — Z86.19 HISTORY OF HELICOBACTER PYLORI INFECTION: Status: RESOLVED | Noted: 2019-01-28 | Resolved: 2023-07-20

## 2023-07-20 PROCEDURE — 90471 IMMUNIZATION ADMIN: CPT | Performed by: INTERNAL MEDICINE

## 2023-07-20 PROCEDURE — 90746 HEPB VACCINE 3 DOSE ADULT IM: CPT | Performed by: INTERNAL MEDICINE

## 2023-07-20 PROCEDURE — 99396 PREV VISIT EST AGE 40-64: CPT | Mod: 25 | Performed by: INTERNAL MEDICINE

## 2023-07-20 ASSESSMENT — ENCOUNTER SYMPTOMS
HEADACHES: 1
CONSTIPATION: 0

## 2023-07-20 ASSESSMENT — PAIN SCALES - GENERAL: PAINLEVEL: NO PAIN (0)

## 2023-07-20 NOTE — PROGRESS NOTES
Prior to immunization administration, verified patients identity using patient s name and date of birth. Please see Immunization Activity for additional information.     Screening Questionnaire for Adult Immunization    Are you sick today?   No   Do you have allergies to medications, food, a vaccine component or latex?   No   Have you ever had a serious reaction after receiving a vaccination?   No   Do you have a long-term health problem with heart, lung, kidney, or metabolic disease (e.g., diabetes), asthma, a blood disorder, no spleen, complement component deficiency, a cochlear implant, or a spinal fluid leak?  Are you on long-term aspirin therapy?   No   Do you have cancer, leukemia, HIV/AIDS, or any other immune system problem?   No   Do you have a parent, brother, or sister with an immune system problem?   No   In the past 3 months, have you taken medications that affect  your immune system, such as prednisone, other steroids, or anticancer drugs; drugs for the treatment of rheumatoid arthritis, Crohn s disease, or psoriasis; or have you had radiation treatments?   No   Have you had a seizure, or a brain or other nervous system problem?   No   During the past year, have you received a transfusion of blood or blood    products, or been given immune (gamma) globulin or antiviral drug?   No   For women: Are you pregnant or is there a chance you could become       pregnant during the next month?   No   Have you received any vaccinations in the past 4 weeks?   No     Immunization questionnaire answers were all negative. hep B       Patient instructed to remain in clinic for 15 minutes afterwards, and to report any adverse reactions.     Screening performed by Clau Toro MA on 7/20/2023 at 3:14 PM.

## 2023-07-20 NOTE — PROGRESS NOTES
SUBJECTIVE:   CC: Ara is an 57 year old who presents for preventive health visit.     57-year-old young lady originally from [facility] came in for a preventive physical examination.  Overall she has been doing well.  She offers no new problems or concerns.  Indicates that at one time she did have GERD problem but that has resolved.  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  She has managed to lose some weight.  Gives history of occasional getting a headache which has been classified as tension headache but very well could be migrainous type as well.  Gives history of having had meningioma that was surgically treated in [place] in 2013.  For the past 1 week she has noticed a little bit of numbness of the right hand particular when she has been holding the phone for a long time or driving longer distance.  Did not have this issue before.  No weakness.        7/20/2023     2:20 PM   Additional Questions   Roomed by Diane Lynne Schoenherr RN     Healthy Habits:     Getting at least 3 servings of Calcium per day:  Yes    Bi-annual eye exam:  Yes    Dental care twice a year:  Yes    Sleep apnea or symptoms of sleep apnea:  None    Diet:  Regular (no restrictions)    Frequency of exercise:  4-5 days/week    Duration of exercise:  15-30 minutes    Taking medications regularly:  Yes    Medication side effects:  Not applicable and None    Additional concerns today:  Yes      Today's PHQ-2 Score:       7/20/2023    12:43 AM   PHQ-2 ( 1999 [employer])   Q1: Little interest or pleasure in doing things 0   Q2: Feeling down, depressed or hopeless 0   PHQ-2 Score 0   Q1: Little interest or pleasure in doing things Not at all   Q2: Feeling down, depressed or hopeless Not at all   PHQ-2 Score 0                 Social History     Tobacco Use     Smoking status: Never     Passive exposure: Never     Smokeless tobacco: Never   Substance Use Topics     Alcohol use: No             7/20/2023     12:42 AM   Alcohol Use   Prescreen: >3 drinks/day or >7 drinks/week? Not Applicable     Reviewed orders with patient.  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     Mixed hyperlipidemia     Episodic tension-type headache, not intractable Rt temporal to Lt side since 2014 tumor      Abnormal CXR with multiple calcified nodules RUL  12-17     Class 2 obesity due to excess calories without serious comorbidity with body mass index (BMI) of 38.0 to 38.9 in adult     Bilateral low back pain without sciatica     History of meningioma     S/P resection of meningioma     Past Surgical History:   Procedure Laterality Date     COLONOSCOPY N/A 01/15/2018    Procedure: COLONOSCOPY;  colonoscopy;  Surgeon: Kaveh Raymundo MD;  Location: [facility]     CRANIOTOMY Left 08/2013    meningioma     LAP VENTRAL HERNIA REPAIR  06/12/2021    incarcerated hernia       Social History     Tobacco Use     Smoking status: Never     Passive exposure: Never     Smokeless tobacco: Never   Substance Use Topics     Alcohol use: No     Family History   Problem Relation Age of Onset     Cerebrovascular Disease Mother 76     Hypertension Mother      Cerebrovascular Disease Father 94     No Known Problems Sister      No Known Problems Sister      No Known Problems Sister      No Known Problems Sister      No Known Problems Brother      No Known Problems Brother      No Known Problems Brother      No Known Problems Maternal Grandmother      No Known Problems Maternal Grandfather      No Known Problems Paternal Grandmother      No Known Problems Paternal  Grandfather      No Known Problems Daughter      No Known Problems Son      No Known Problems Maternal Half-Brother      No Known Problems Maternal Half-Sister      No Known Problems Paternal Half-Brother      No Known Problems Paternal Half-Sister      No Known Problems Niece      No Known Problems Nephew      No Known Problems Cousin      No Known Problems Other      Breast Cancer No family hx of      Cancer - colorectal No family hx of      Diabetes No family hx of      Coronary Artery Disease No family hx of      Hyperlipidemia No family hx of      Colon Cancer No family hx of      Prostate Cancer No family hx of      Other Cancer No family hx of      Depression No family hx of      Anxiety Disorder No family hx of      Mental Illness No family hx of      Substance Abuse No family hx of      Anesthesia Reaction No family hx of      Asthma No family hx of      Osteoporosis No family hx of      Genetic Disorder No family hx of      Thyroid Disease No family hx of      Obesity No family hx of      Unknown/Adopted No family hx of          Current Outpatient Medications   Medication Sig Dispense Refill     butalbital-acetaminophen-caffeine (ESGIC) -40 MG tablet Take 1 tablet by mouth 2 times daily as needed for headaches 10 tablet 1     Calcium Carbonate-Simethicone 500-125 MG CHEW Take 1 tablet by mouth 3 times daily as needed 120 tablet 0     cetirizine (ZYRTEC) 10 MG tablet Take 1 tablet (10 mg) by mouth daily as needed for allergies 90 tablet 1     Allergies   Allergen Reactions     No Known Allergies        Breast Cancer Screenin/24/2022    10:10 AM   Breast CA Risk Assessment (FHS-7)   Do you have a family history of breast, colon, or ovarian cancer? No / Unknown       click delete button to remove this line now    Pertinent mammograms are reviewed under the imaging tab.    History of abnormal Pap smear:       Latest Ref Rng & Units 2022    10:41 AM 2019     4:39 PM 2019     4:00  PM   PAP / HPV   PAP  Negative for Intraepithelial Lesion or Malignancy (NILM)      PAP (Historical)    NIL    HPV 16 DNA Negative Negative  Negative     HPV 18 DNA Negative Negative  Negative     Other HR HPV Negative Negative  Negative       Reviewed and updated as needed this visit by clinical staff   Tobacco  Allergies  Meds              Reviewed and updated as needed this visit by Provider                 Past Medical History:   Diagnosis Date     GERD (gastroesophageal reflux disease) 10/3/2014     History of Helicobacter pylori infection 1/28/2019     History of meningioma 9/27/2019     Meningioma (H)      Positive QuantiFERON-TB Gold test 9/18/2017      Past Surgical History:   Procedure Laterality Date     COLONOSCOPY N/A 01/15/2018    Procedure: COLONOSCOPY;  colonoscopy;  Surgeon: Kaveh Raymundo MD;  Location:  GI     CRANIOTOMY Left 08/2013    meningioma     LAP VENTRAL HERNIA REPAIR  06/12/2021    incarcerated hernia       Review of Systems   Gastrointestinal: Negative for constipation.   Neurological: Positive for headaches.     CONSTITUTIONAL: NEGATIVE for fever, chills, change in weight  INTEGUMENTARY/SKIN: NEGATIVE for worrisome rashes, moles or lesions  EYES: NEGATIVE for vision changes or irritation  ENT: NEGATIVE for ear, mouth and throat problems  RESP: NEGATIVE for significant cough or SOB  BREAST: NEGATIVE for masses, tenderness or discharge  CV: NEGATIVE for chest pain, palpitations or peripheral edema  GI: NEGATIVE for nausea, abdominal pain, heartburn, or change in bowel habits  : NEGATIVE for unusual urinary or vaginal symptoms. No vaginal bleeding.  MUSCULOSKELETAL: NEGATIVE for significant arthralgias or myalgia  NEURO: NEGATIVE for weakness, dizziness or paresthesias  ENDOCRINE: NEGATIVE for temperature intolerance, skin/hair changes  HEME/ALLERGY/IMMUNE: NEGATIVE for bleeding problems  PSYCHIATRIC: NEGATIVE for changes in mood or affect      OBJECTIVE:   /69 (BP  "Location: Right arm, Patient Position: Sitting, Cuff Size: Adult Large)   Pulse 66   Temp 98  F (36.7  C) (Oral)   Resp 16   Ht 1.55 m (5' 1.02\")   Wt 92.3 kg (203 lb 9 oz)   LMP 07/01/2018   SpO2 100%   BMI 38.43 kg/m    Physical Exam  GENERAL: healthy, alert and no distress  EYES: Eyes grossly normal to inspection, PERRL and conjunctivae and sclerae normal  HENT: ear canals and TM's normal, nose and mouth without ulcers or lesions  NECK: no adenopathy, no asymmetry, masses, or scars and thyroid normal to palpation  RESP: lungs clear to auscultation - no rales, rhonchi or wheezes  BREAST: normal without masses, tenderness or nipple discharge and no palpable axillary masses or adenopathy  CV: regular rate and rhythm, normal S1 S2, no S3 or S4, no murmur, click or rub, no peripheral edema and peripheral pulses strong  ABDOMEN: soft, nontender, no hepatosplenomegaly, no masses and bowel sounds normal  MS: no gross musculoskeletal defects noted, no edema  SKIN: no suspicious lesions or rashes  NEURO: Normal strength and tone, mentation intact and speech normal  PSYCH: mentation appears normal, affect normal/bright  LYMPH: no cervical, supraclavicular, axillary, or inguinal adenopathy        ASSESSMENT/PLAN:     1.  Annual physical exam.  Overall good.  2.  Class II obesity with a BMI of 38.4.  Weight reduction discussed.  3.  Visit for screening mammogram.  Order placed.  4.  Need for hepatitis B vaccination.  First dose given today.  She will return for second and third as a nurse visit.  5.  Screening for lipid disorder.  6.  Right hand numbness could be early sign of carpal tunnel syndrome.  Clinically exam was negative.  7.  Had negative Pap smear 2022 so not due for 4 years.    I have we will be checking CBC, CMP lipids and I will get back to her with results.      Patient has been advised of split billing requirements and indicates understanding: Yes      COUNSELING:  Reviewed preventive health " "counseling, as reflected in patient instructions       Regular exercise       Healthy diet/nutrition       Vision screening      BMI:   Estimated body mass index is 38.43 kg/m  as calculated from the following:    Height as of this encounter: 1.55 m (5' 1.02\").    Weight as of this encounter: 92.3 kg (203 lb 9 oz).   Weight management plan: Discussed healthy diet and exercise guidelines      She reports that she has never smoked. She has never been exposed to tobacco smoke. She has never used smokeless tobacco.      Victoriano Issa MD  [facility]  "

## 2023-07-26 ENCOUNTER — LAB (OUTPATIENT)
Dept: LAB | Facility: CLINIC | Age: 58
End: 2023-07-26
Payer: COMMERCIAL

## 2023-07-26 DIAGNOSIS — E66.09 CLASS 2 OBESITY DUE TO EXCESS CALORIES WITHOUT SERIOUS COMORBIDITY WITH BODY MASS INDEX (BMI) OF 38.0 TO 38.9 IN ADULT: ICD-10-CM

## 2023-07-26 DIAGNOSIS — Z00.00 ANNUAL PHYSICAL EXAM: ICD-10-CM

## 2023-07-26 DIAGNOSIS — E66.812 CLASS 2 OBESITY DUE TO EXCESS CALORIES WITHOUT SERIOUS COMORBIDITY WITH BODY MASS INDEX (BMI) OF 38.0 TO 38.9 IN ADULT: ICD-10-CM

## 2023-07-26 DIAGNOSIS — Z13.220 SCREENING FOR LIPID DISORDERS: ICD-10-CM

## 2023-07-26 LAB
ALBUMIN SERPL BCG-MCNC: 4.1 G/DL (ref 3.5–5.2)
ALP SERPL-CCNC: 83 U/L (ref 35–104)
ALT SERPL W P-5'-P-CCNC: 18 U/L (ref 0–50)
ANION GAP SERPL CALCULATED.3IONS-SCNC: 9 MMOL/L (ref 7–15)
AST SERPL W P-5'-P-CCNC: 37 U/L (ref 0–45)
BILIRUB SERPL-MCNC: 0.4 MG/DL
BUN SERPL-MCNC: 11.7 MG/DL (ref 6–20)
CALCIUM SERPL-MCNC: 9.8 MG/DL (ref 8.6–10)
CHLORIDE SERPL-SCNC: 109 MMOL/L (ref 98–107)
CHOLEST SERPL-MCNC: 186 MG/DL
CREAT SERPL-MCNC: 0.67 MG/DL (ref 0.51–0.95)
DEPRECATED HCO3 PLAS-SCNC: 25 MMOL/L (ref 22–29)
ERYTHROCYTE [DISTWIDTH] IN BLOOD BY AUTOMATED COUNT: 14.4 % (ref 10–15)
GFR SERPL CREATININE-BSD FRML MDRD: >90 ML/MIN/1.73M2
GLUCOSE SERPL-MCNC: 90 MG/DL (ref 70–99)
HCT VFR BLD AUTO: 39 % (ref 35–47)
HDLC SERPL-MCNC: 47 MG/DL
HGB BLD-MCNC: 12.5 G/DL (ref 11.7–15.7)
LDLC SERPL CALC-MCNC: 113 MG/DL
MCH RBC QN AUTO: 26.5 PG (ref 26.5–33)
MCHC RBC AUTO-ENTMCNC: 32.1 G/DL (ref 31.5–36.5)
MCV RBC AUTO: 83 FL (ref 78–100)
NONHDLC SERPL-MCNC: 139 MG/DL
PLATELET # BLD AUTO: 221 10E3/UL (ref 150–450)
POTASSIUM SERPL-SCNC: 4.3 MMOL/L (ref 3.4–5.3)
PROT SERPL-MCNC: 7 G/DL (ref 6.4–8.3)
RBC # BLD AUTO: 4.71 10E6/UL (ref 3.8–5.2)
SODIUM SERPL-SCNC: 143 MMOL/L (ref 136–145)
TRIGL SERPL-MCNC: 132 MG/DL
WBC # BLD AUTO: 4.4 10E3/UL (ref 4–11)

## 2023-07-26 PROCEDURE — 80061 LIPID PANEL: CPT

## 2023-07-26 PROCEDURE — 85027 COMPLETE CBC AUTOMATED: CPT

## 2023-07-26 PROCEDURE — 80053 COMPREHEN METABOLIC PANEL: CPT

## 2023-07-26 PROCEDURE — 36415 COLL VENOUS BLD VENIPUNCTURE: CPT

## 2023-07-27 DIAGNOSIS — Z12.11 SCREENING FOR COLON CANCER: Primary | ICD-10-CM

## 2023-08-18 ENCOUNTER — ANCILLARY PROCEDURE (OUTPATIENT)
Dept: MAMMOGRAPHY | Facility: CLINIC | Age: 58
End: 2023-08-18
Attending: INTERNAL MEDICINE
Payer: COMMERCIAL

## 2023-08-18 DIAGNOSIS — Z12.31 VISIT FOR SCREENING MAMMOGRAM: ICD-10-CM

## 2023-08-18 PROCEDURE — 77067 SCR MAMMO BI INCL CAD: CPT | Performed by: STUDENT IN AN ORGANIZED HEALTH CARE EDUCATION/TRAINING PROGRAM

## 2023-08-21 ENCOUNTER — ALLIED HEALTH/NURSE VISIT (OUTPATIENT)
Dept: FAMILY MEDICINE | Facility: CLINIC | Age: 58
End: 2023-08-21
Payer: COMMERCIAL

## 2023-08-21 DIAGNOSIS — Z23 NEED FOR VACCINATION: Primary | ICD-10-CM

## 2023-08-21 PROCEDURE — 99207 PR NO CHARGE NURSE ONLY: CPT

## 2023-08-21 PROCEDURE — 90471 IMMUNIZATION ADMIN: CPT

## 2023-08-21 PROCEDURE — 90746 HEPB VACCINE 3 DOSE ADULT IM: CPT

## 2023-08-21 NOTE — NURSING NOTE
Prior to immunization administration, verified patients identity using patient s name and date of birth. Please see Immunization Activity for additional information.     Screening Questionnaire for Adult Immunization    Are you sick today?   No   Do you have allergies to medications, food, a vaccine component or latex?   No   Have you ever had a serious reaction after receiving a vaccination?   No   Do you have a long-term health problem with heart, lung, kidney, or metabolic disease (e.g., diabetes), asthma, a blood disorder, no spleen, complement component deficiency, a cochlear implant, or a spinal fluid leak?  Are you on long-term aspirin therapy?   No   Do you have cancer, leukemia, HIV/AIDS, or any other immune system problem?   No   Do you have a parent, brother, or sister with an immune system problem?   No   In the past 3 months, have you taken medications that affect  your immune system, such as prednisone, other steroids, or anticancer drugs; drugs for the treatment of rheumatoid arthritis, Crohn s disease, or psoriasis; or have you had radiation treatments?   No   Have you had a seizure, or a brain or other nervous system problem?   No   During the past year, have you received a transfusion of blood or blood    products, or been given immune (gamma) globulin or antiviral drug?   No   For women: Are you pregnant or is there a chance you could become       pregnant during the next month?   No   Have you received any vaccinations in the past 4 weeks?   No     Immunization questionnaire answers were all negative.    I have reviewed the following standing orders:   This patient is due and qualifies for the Hepatitis B vaccine.    Click here for Hepatitis B Standing Order    I have reviewed the vaccines inclusion and exclusion criteria; No concerns regarding eligibility.     Patient instructed to remain in clinic for 15 minutes afterwards, and to report any adverse reactions.     Screening performed by Taylor  MICHAEL Valdivia on 8/21/2023 at 9:35 AM.

## 2023-10-19 ENCOUNTER — E-VISIT (OUTPATIENT)
Dept: FAMILY MEDICINE | Facility: CLINIC | Age: 58
End: 2023-10-19
Payer: COMMERCIAL

## 2023-10-19 DIAGNOSIS — L20.84 INTRINSIC ECZEMA: Primary | ICD-10-CM

## 2023-10-19 PROCEDURE — 99421 OL DIG E/M SVC 5-10 MIN: CPT | Performed by: INTERNAL MEDICINE

## 2023-10-19 RX ORDER — TRIAMCINOLONE ACETONIDE 1 MG/G
CREAM TOPICAL 2 TIMES DAILY
Qty: 30 G | Refills: 0 | Status: SHIPPED | OUTPATIENT
Start: 2023-10-19 | End: 2024-10-02

## 2023-10-26 ENCOUNTER — IMMUNIZATION (OUTPATIENT)
Dept: FAMILY MEDICINE | Facility: CLINIC | Age: 58
End: 2023-10-26
Payer: COMMERCIAL

## 2023-10-26 DIAGNOSIS — Z23 ENCOUNTER FOR IMMUNIZATION: Primary | ICD-10-CM

## 2023-10-26 PROCEDURE — 90471 IMMUNIZATION ADMIN: CPT

## 2023-10-26 PROCEDURE — 99207 PR NO CHARGE NURSE ONLY: CPT

## 2023-10-26 PROCEDURE — 90682 RIV4 VACC RECOMBINANT DNA IM: CPT

## 2023-10-26 NOTE — PROGRESS NOTES
Prior to immunization administration, verified patients identity using patient s name and date of birth. Please see Immunization Activity for additional information.     Screening Questionnaire for Adult Immunization    Are you sick today?   No   Do you have allergies to medications, food, a vaccine component or latex?   No   Have you ever had a serious reaction after receiving a vaccination?   No   Do you have a long-term health problem with heart, lung, kidney, or metabolic disease (e.g., diabetes), asthma, a blood disorder, no spleen, complement component deficiency, a cochlear implant, or a spinal fluid leak?  Are you on long-term aspirin therapy?   No   Do you have cancer, leukemia, HIV/AIDS, or any other immune system problem?   No   Do you have a parent, brother, or sister with an immune system problem?   No   In the past 3 months, have you taken medications that affect  your immune system, such as prednisone, other steroids, or anticancer drugs; drugs for the treatment of rheumatoid arthritis, Crohn s disease, or psoriasis; or have you had radiation treatments?   No   Have you had a seizure, or a brain or other nervous system problem?   No   During the past year, have you received a transfusion of blood or blood    products, or been given immune (gamma) globulin or antiviral drug?   No   For women: Are you pregnant or is there a chance you could become       pregnant during the next month?   No   Have you received any vaccinations in the past 4 weeks?   No     Immunization questionnaire answers were all negative.    I have reviewed the following standing orders:   This patient is due and qualifies for the Influenza vaccine.    Click here for Influenza Vaccine Standing Order    I have reviewed the vaccines inclusion and exclusion criteria; No concerns regarding eligibility.     Patient instructed to remain in clinic for 15 minutes afterwards, and to report any adverse reactions.     Screening performed by  Clau Toro MA on 10/26/2023 at 9:41 AM.

## 2024-06-20 ENCOUNTER — PATIENT OUTREACH (OUTPATIENT)
Dept: CARE COORDINATION | Facility: CLINIC | Age: 59
End: 2024-06-20
Payer: COMMERCIAL

## 2024-07-04 ENCOUNTER — PATIENT OUTREACH (OUTPATIENT)
Dept: CARE COORDINATION | Facility: CLINIC | Age: 59
End: 2024-07-04
Payer: COMMERCIAL

## 2024-08-08 ENCOUNTER — PATIENT OUTREACH (OUTPATIENT)
Dept: CARE COORDINATION | Facility: CLINIC | Age: 59
End: 2024-08-08
Payer: COMMERCIAL

## 2024-09-04 ENCOUNTER — ANCILLARY PROCEDURE (OUTPATIENT)
Dept: MAMMOGRAPHY | Facility: CLINIC | Age: 59
End: 2024-09-04
Attending: INTERNAL MEDICINE
Payer: COMMERCIAL

## 2024-09-04 DIAGNOSIS — Z12.31 VISIT FOR SCREENING MAMMOGRAM: ICD-10-CM

## 2024-09-04 PROCEDURE — 77067 SCR MAMMO BI INCL CAD: CPT | Mod: GC

## 2024-09-04 PROCEDURE — 77063 BREAST TOMOSYNTHESIS BI: CPT | Mod: GC

## 2024-09-15 ENCOUNTER — HEALTH MAINTENANCE LETTER (OUTPATIENT)
Age: 59
End: 2024-09-15

## 2024-10-01 SDOH — HEALTH STABILITY: PHYSICAL HEALTH: ON AVERAGE, HOW MANY MINUTES DO YOU ENGAGE IN EXERCISE AT THIS LEVEL?: 120 MIN

## 2024-10-01 SDOH — HEALTH STABILITY: PHYSICAL HEALTH: ON AVERAGE, HOW MANY DAYS PER WEEK DO YOU ENGAGE IN MODERATE TO STRENUOUS EXERCISE (LIKE A BRISK WALK)?: 5 DAYS

## 2024-10-01 ASSESSMENT — SOCIAL DETERMINANTS OF HEALTH (SDOH): HOW OFTEN DO YOU GET TOGETHER WITH FRIENDS OR RELATIVES?: TWICE A WEEK

## 2024-10-02 ENCOUNTER — OFFICE VISIT (OUTPATIENT)
Dept: FAMILY MEDICINE | Facility: CLINIC | Age: 59
End: 2024-10-02
Payer: COMMERCIAL

## 2024-10-02 VITALS
WEIGHT: 195 LBS | TEMPERATURE: 97.6 F | DIASTOLIC BLOOD PRESSURE: 77 MMHG | HEIGHT: 62 IN | BODY MASS INDEX: 35.88 KG/M2 | OXYGEN SATURATION: 98 % | SYSTOLIC BLOOD PRESSURE: 121 MMHG | HEART RATE: 76 BPM | RESPIRATION RATE: 17 BRPM

## 2024-10-02 DIAGNOSIS — Z98.890 S/P RESECTION OF MENINGIOMA: ICD-10-CM

## 2024-10-02 DIAGNOSIS — E66.812 CLASS 2 SEVERE OBESITY DUE TO EXCESS CALORIES WITH SERIOUS COMORBIDITY AND BODY MASS INDEX (BMI) OF 36.0 TO 36.9 IN ADULT (H): ICD-10-CM

## 2024-10-02 DIAGNOSIS — Z86.018 S/P RESECTION OF MENINGIOMA: ICD-10-CM

## 2024-10-02 DIAGNOSIS — Z23 HIGH PRIORITY FOR 2019-NCOV VACCINE: ICD-10-CM

## 2024-10-02 DIAGNOSIS — E78.2 MIXED HYPERLIPIDEMIA: ICD-10-CM

## 2024-10-02 DIAGNOSIS — E66.01 CLASS 2 SEVERE OBESITY DUE TO EXCESS CALORIES WITH SERIOUS COMORBIDITY AND BODY MASS INDEX (BMI) OF 36.0 TO 36.9 IN ADULT (H): ICD-10-CM

## 2024-10-02 DIAGNOSIS — Z23 ENCOUNTER FOR IMMUNIZATION: ICD-10-CM

## 2024-10-02 DIAGNOSIS — Z13.1 SCREENING FOR DIABETES MELLITUS: ICD-10-CM

## 2024-10-02 DIAGNOSIS — Z00.00 ANNUAL PHYSICAL EXAM: Primary | ICD-10-CM

## 2024-10-02 DIAGNOSIS — G44.219 EPISODIC TENSION-TYPE HEADACHE, NOT INTRACTABLE: ICD-10-CM

## 2024-10-02 PROBLEM — E66.09 CLASS 2 OBESITY DUE TO EXCESS CALORIES WITHOUT SERIOUS COMORBIDITY WITH BODY MASS INDEX (BMI) OF 38.0 TO 38.9 IN ADULT: Status: RESOLVED | Noted: 2019-01-28 | Resolved: 2024-10-02

## 2024-10-02 PROCEDURE — 91320 SARSCV2 VAC 30MCG TRS-SUC IM: CPT | Performed by: INTERNAL MEDICINE

## 2024-10-02 PROCEDURE — 90480 ADMN SARSCOV2 VAC 1/ONLY CMP: CPT | Performed by: INTERNAL MEDICINE

## 2024-10-02 PROCEDURE — 99396 PREV VISIT EST AGE 40-64: CPT | Mod: 25 | Performed by: INTERNAL MEDICINE

## 2024-10-02 PROCEDURE — 90472 IMMUNIZATION ADMIN EACH ADD: CPT | Performed by: INTERNAL MEDICINE

## 2024-10-02 PROCEDURE — 90715 TDAP VACCINE 7 YRS/> IM: CPT | Performed by: INTERNAL MEDICINE

## 2024-10-02 PROCEDURE — 90471 IMMUNIZATION ADMIN: CPT | Performed by: INTERNAL MEDICINE

## 2024-10-02 PROCEDURE — 90673 RIV3 VACCINE NO PRESERV IM: CPT | Performed by: INTERNAL MEDICINE

## 2024-10-02 ASSESSMENT — PAIN SCALES - GENERAL: PAINLEVEL: MODERATE PAIN (5)

## 2024-10-02 NOTE — PROGRESS NOTES
"Preventive Care Visit  [facility]  Victoriano Issa MD, Internal Medicine  Oct 2, 2024      Assessment & Plan     1.  Annual physical exam completed and overall good.  2.  Class II obesity.  Discussed weight reduction.  3.  Mixed dyslipidemia.  Fasting lipids will be checked.  4.  Episodic tension type headache right temporal area and nostril since 2014.  Tylenol ibuprofen does help.  5.  S/p resection of meningioma.  6.  Encounter for immunization including COVID-19, flu vaccine and Tdap booster.  7.  Screening for diabetes mellitus.    Lipids and fasting blood sugar will be checked.  I will get back to her with the results.  Her recent mammogram was good.    Patient has been advised of split billing requirements and indicates understanding: Yes        BMI  Estimated body mass index is 36.25 kg/m  as calculated from the following:    Height as of this encounter: 1.562 m (5' 1.5\").    Weight as of this encounter: 88.5 kg (195 lb).   Weight management plan: Discussed healthy diet and exercise guidelines    Counseling  Appropriate preventive services were addressed with this patient via screening, questionnaire, or discussion as appropriate for fall prevention, nutrition, physical activity, Tobacco-use cessation, social engagement, weight loss and cognition.  Checklist reviewing preventive services available has been given to the patient.  Reviewed patient's diet, addressing concerns and/or questions.   The patient was instructed to see the dentist every 6 months.       Subjective   Ara is a 59 year old, presenting for the following:  Physical (Do I need a repeat of colonoscopy? It still shows in my chart that it is overdue.)        10/2/2024     3:51 PM   Additional Questions   Roomed by Tyrell   Accompanied by self         10/2/2024     3:51 PM   Patient Reported Additional Medications   Patient reports taking the following new medications no        Health Care Directive  Patient does not have a " Health Care Directive or Living Will: Discussed advance care planning with patient; however, patient declined at this time.    HPI    59-year-old lady comes in for annual physical examination.  Overall she is doing well.  She finds vegetables.  She has a chronic history of headache which has remained unchanged.          10/1/2024   General Health   How would you rate your overall physical health? Good   Feel stress (tense, anxious, or unable to sleep) Not at all            10/1/2024   Nutrition   Three or more servings of calcium each day? Yes   Diet: I don't know   How many servings of fruit and vegetables per day? (!) 0-1   How many sweetened beverages each day? 0-1            10/1/2024   Exercise   Days per week of moderate/strenous exercise 5 days   Average minutes spent exercising at this level 120 min            10/1/2024   Social Factors   Frequency of gathering with friends or relatives Twice a week   Worry food won't last until get money to buy more Patient declined   Food not last or not have enough money for food? Patient declined   Do you have housing? (Housing is defined as stable permanent housing and does not include staying ouside in a car, in a tent, in an abandoned building, in an overnight shelter, or couch-surfing.) Patient declined   Are you worried about losing your housing? Patient declined   Lack of transportation? Patient declined   Unable to get utilities (heat,electricity)? Patient declined            10/1/2024   Fall Risk   Fallen 2 or more times in the past year? No   Trouble with walking or balance? No             10/1/2024   Dental   Dentist two times every year? (!) NO               Today's PHQ-2 Score:       10/1/2024    10:45 PM   PHQ-2 ( 1999 [employer])   Q1: Little interest or pleasure in doing things 0   Q2: Feeling down, depressed or hopeless 0   PHQ-2 Score 0   Q1: Little interest or pleasure in doing things Not at all   Q2: Feeling down, depressed or hopeless Not at all   PHQ-2  Score 0           10/1/2024   Substance Use   Alcohol more than 3/day or more than 7/wk Not Applicable   Do you use any other substances recreationally? No        Social History     Tobacco Use    Smoking status: Never     Passive exposure: Never    Smokeless tobacco: Never   Vaping Use    Vaping status: Never Used   Substance Use Topics    Alcohol use: No    Drug use: No           2024   LAST FHS-7 RESULTS   1st degree relative breast or ovarian cancer No   Any relative bilateral breast cancer No   Any male have breast cancer No   Any ONE woman have BOTH breast AND ovarian cancer No   Any woman with breast cancer before 50yrs No   2 or more relatives with breast AND/OR ovarian cancer No   2 or more relatives with breast AND/OR bowel cancer No           Mammogram Screening - Mammogram every 1-2 years updated in Health Maintenance based on mutual decision making        10/1/2024   STI Screening   New sexual partner(s) since last STI/HIV test? No        History of abnormal Pap smear: no        Latest Ref Rng & Units 2022    10:41 AM 2019     4:39 PM 2019     4:00 PM   PAP / HPV   PAP  Negative for Intraepithelial Lesion or Malignancy (NILM)      PAP (Historical)    NIL    HPV 16 DNA Negative Negative  Negative     HPV 18 DNA Negative Negative  Negative     Other HR HPV Negative Negative  Negative       ASCVD Risk   The 10-year ASCVD risk score (Rupal EDWARDS, et al., 2019) is: 3.5%    Values used to calculate the score:      Age: 59 years      Sex: Female      Is Non- : Yes      Diabetic: No      Tobacco smoker: No      Systolic Blood Pressure: 121 mmHg      Is BP treated: No      HDL Cholesterol: 47 mg/dL      Total Cholesterol: 186 mg/dL    Fracture Risk Assessment Tool  Link to Frax Calculator  Use the information below to complete the Frax calculator  : 1965  Sex: female  Weight (kg): 88.5 kg (actual weight)  Height (cm): 156.2 cm  Previous Fragility  Fracture:  No  History of parent with fractured hip:  No  Current Smoking:  No  Patient has been on glucocorticoids for more than 3 months (5mg/day or more): No  Rheumatoid Arthritis on Problem List:  No  Secondary Osteoporosis on Problem List:  No  Consumes 3 or more units of alcohol per day: No  Femoral Neck BMD (g/cm2)           Reviewed and updated as needed this visit by Provider                    Past Medical History:   Diagnosis Date    GERD (gastroesophageal reflux disease) 10/3/2014    History of Helicobacter pylori infection 1/28/2019    History of meningioma 9/27/2019    Meningioma (H)     Positive QuantiFERON-TB Gold test 9/18/2017     Past Surgical History:   Procedure Laterality Date    COLONOSCOPY N/A 01/15/2018    Procedure: COLONOSCOPY;  colonoscopy;  Surgeon: Kaveh Raymundo MD;  Location:  GI    CRANIOTOMY Left 08/2013    meningioma    LAP VENTRAL HERNIA REPAIR  06/12/2021    incarcerated hernia     BP Readings from Last 3 Encounters:   10/02/24 121/77   07/20/23 116/69   06/24/22 138/84    Wt Readings from Last 3 Encounters:   10/02/24 88.5 kg (195 lb)   07/20/23 92.3 kg (203 lb 9 oz)   06/24/22 91.6 kg (202 lb)                  Patient Active Problem List   Diagnosis    GERD (gastroesophageal reflux disease)    Microscopic hematuria    Positive QuantiFERON-TB Gold test    Mixed hyperlipidemia    Episodic tension-type headache, not intractable Rt temporal to Lt side since 2014 tumor     Abnormal CXR with multiple calcified nodules RUL  12-17    Bilateral low back pain without sciatica    History of meningioma    S/P resection of meningioma    Class 2 severe obesity due to excess calories with serious comorbidity in adult (H)     Past Surgical History:   Procedure Laterality Date    COLONOSCOPY N/A 01/15/2018    Procedure: COLONOSCOPY;  colonoscopy;  Surgeon: Kaveh Raymundo MD;  Location:  GI    CRANIOTOMY Left 08/2013    meningioma    LAP VENTRAL HERNIA REPAIR  06/12/2021     incarcerated hernia       Social History     Tobacco Use    Smoking status: Never     Passive exposure: Never    Smokeless tobacco: Never   Substance Use Topics    Alcohol use: No     Family History   Problem Relation Age of Onset    Cerebrovascular Disease Mother 76    Hypertension Mother     Cerebrovascular Disease Father 94    No Known Problems Sister     No Known Problems Sister     No Known Problems Sister     No Known Problems Sister     No Known Problems Brother     No Known Problems Brother     No Known Problems Brother     No Known Problems Maternal Grandmother     No Known Problems Maternal Grandfather     No Known Problems Paternal Grandmother     No Known Problems Paternal Grandfather     No Known Problems Daughter     No Known Problems Son     No Known Problems Maternal Half-Brother     No Known Problems Maternal Half-Sister     No Known Problems Paternal Half-Brother     No Known Problems Paternal Half-Sister     No Known Problems Niece     No Known Problems Nephew     No Known Problems Cousin     No Known Problems Other     Breast Cancer No family hx of     Cancer - colorectal No family hx of     Diabetes No family hx of     Coronary Artery Disease No family hx of     Hyperlipidemia No family hx of     Colon Cancer No family hx of     Prostate Cancer No family hx of     Other Cancer No family hx of     Depression No family hx of     Anxiety Disorder No family hx of     Mental Illness No family hx of     Substance Abuse No family hx of     Anesthesia Reaction No family hx of     Asthma No family hx of     Osteoporosis No family hx of     Genetic Disorder No family hx of     Thyroid Disease No family hx of     Obesity No family hx of     Unknown/Adopted No family hx of          Current Outpatient Medications   Medication Sig Dispense Refill    Calcium Carbonate-Simethicone 500-125 MG CHEW Take 1 tablet by mouth 3 times daily as needed 120 tablet 0     Allergies   Allergen Reactions    No Known Allergies  "         Review of Systems  Constitutional, HEENT, cardiovascular, pulmonary, GI, , musculoskeletal, neuro, skin, endocrine and psych systems are negative, except as otherwise noted.     Objective    Exam  /77 (BP Location: Right arm, Patient Position: Sitting, Cuff Size: Adult Large)   Pulse 76   Temp 97.6  F (36.4  C) (Temporal)   Resp 17   Ht 1.562 m (5' 1.5\")   Wt 88.5 kg (195 lb)   LMP 07/01/2018   SpO2 98%   BMI 36.25 kg/m     Estimated body mass index is 36.25 kg/m  as calculated from the following:    Height as of this encounter: 1.562 m (5' 1.5\").    Weight as of this encounter: 88.5 kg (195 lb).    Physical Exam  GENERAL: alert and no distress  EYES: Eyes grossly normal to inspection, PERRL and conjunctivae and sclerae normal  HENT: ear canals and TM's normal, nose and mouth without ulcers or lesions  NECK: no adenopathy, no asymmetry, masses, or scars  RESP: lungs clear to auscultation - no rales, rhonchi or wheezes  BREAST: normal without masses, tenderness or nipple discharge and no palpable axillary masses or adenopathy  CV: regular rate and rhythm, normal S1 S2, no S3 or S4, no murmur, click or rub, no peripheral edema  ABDOMEN: soft, nontender, no hepatosplenomegaly, no masses and bowel sounds normal  MS: no gross musculoskeletal defects noted, no edema  NEURO: Normal strength and tone, mentation intact and speech normal  PSYCH: mentation appears normal, affect normal/bright  LYMPH: no cervical, supraclavicular, axillary, or inguinal adenopathy        Signed Electronically by: Victoriano Issa MD    "

## 2024-10-02 NOTE — PROGRESS NOTES
Prior to immunization administration, verified patients identity using patient s name and date of birth. Please see Immunization Activity for additional information.     Screening Questionnaire for Adult Immunization    Are you sick today?   No   Do you have allergies to medications, food, a vaccine component or latex?   No   Have you ever had a serious reaction after receiving a vaccination?   No   Do you have a long-term health problem with heart, lung, kidney, or metabolic disease (e.g., diabetes), asthma, a blood disorder, no spleen, complement component deficiency, a cochlear implant, or a spinal fluid leak?  Are you on long-term aspirin therapy?   No   Do you have cancer, leukemia, HIV/AIDS, or any other immune system problem?   No   Do you have a parent, brother, or sister with an immune system problem?   No   In the past 3 months, have you taken medications that affect  your immune system, such as prednisone, other steroids, or anticancer drugs; drugs for the treatment of rheumatoid arthritis, Crohn s disease, or psoriasis; or have you had radiation treatments?   No   Have you had a seizure, or a brain or other nervous system problem?   No   During the past year, have you received a transfusion of blood or blood    products, or been given immune (gamma) globulin or antiviral drug?   No   For women: Are you pregnant or is there a chance you could become       pregnant during the next month?   No   Have you received any vaccinations in the past 4 weeks?   No     Immunization questionnaire answers were all negative.      Patient instructed to remain in clinic for 15 minutes afterwards, and to report any adverse reactions.     Screening performed by Clau Toro MA on 10/2/2024 at 4:44 PM.    Statement Selected

## 2024-10-11 ENCOUNTER — LAB (OUTPATIENT)
Dept: LAB | Facility: CLINIC | Age: 59
End: 2024-10-11
Payer: COMMERCIAL

## 2024-10-11 DIAGNOSIS — E78.2 MIXED HYPERLIPIDEMIA: ICD-10-CM

## 2024-10-11 DIAGNOSIS — Z13.1 SCREENING FOR DIABETES MELLITUS: ICD-10-CM

## 2024-10-11 LAB
CHOLEST SERPL-MCNC: 197 MG/DL
FASTING STATUS PATIENT QL REPORTED: YES
FASTING STATUS PATIENT QL REPORTED: YES
GLUCOSE SERPL-MCNC: 90 MG/DL (ref 70–99)
HDLC SERPL-MCNC: 48 MG/DL
LDLC SERPL CALC-MCNC: 129 MG/DL
NONHDLC SERPL-MCNC: 149 MG/DL
TRIGL SERPL-MCNC: 99 MG/DL

## 2024-10-11 PROCEDURE — 36415 COLL VENOUS BLD VENIPUNCTURE: CPT

## 2024-10-11 PROCEDURE — 82947 ASSAY GLUCOSE BLOOD QUANT: CPT

## 2024-10-11 PROCEDURE — 80061 LIPID PANEL: CPT

## 2025-09-03 ENCOUNTER — PATIENT OUTREACH (OUTPATIENT)
Dept: CARE COORDINATION | Facility: CLINIC | Age: 60
End: 2025-09-03
Payer: COMMERCIAL

## (undated) RX ORDER — FENTANYL CITRATE 50 UG/ML
INJECTION, SOLUTION INTRAMUSCULAR; INTRAVENOUS
Status: DISPENSED
Start: 2018-01-15